# Patient Record
Sex: MALE | Race: BLACK OR AFRICAN AMERICAN | NOT HISPANIC OR LATINO | Employment: FULL TIME | URBAN - METROPOLITAN AREA
[De-identification: names, ages, dates, MRNs, and addresses within clinical notes are randomized per-mention and may not be internally consistent; named-entity substitution may affect disease eponyms.]

---

## 2017-07-27 ENCOUNTER — HOSPITAL ENCOUNTER (EMERGENCY)
Facility: HOSPITAL | Age: 32
Discharge: HOME | End: 2017-07-27
Admitting: EMERGENCY MEDICINE
Payer: COMMERCIAL

## 2017-07-27 VITALS
DIASTOLIC BLOOD PRESSURE: 69 MMHG | WEIGHT: 142.2 LBS | SYSTOLIC BLOOD PRESSURE: 125 MMHG | TEMPERATURE: 98.3 F | HEART RATE: 90 BPM | OXYGEN SATURATION: 98 % | RESPIRATION RATE: 14 BRPM

## 2017-07-27 DIAGNOSIS — L02.31 ABSCESS OF BUTTOCK, LEFT: Primary | ICD-10-CM

## 2017-07-27 PROCEDURE — 99283 EMERGENCY DEPT VISIT LOW MDM: CPT

## 2017-07-27 RX ORDER — HYDROCODONE BITARTRATE AND ACETAMINOPHEN 5; 325 MG/1; MG/1
1 TABLET ORAL EVERY 6 HOURS PRN
Qty: 8 TABLET | Refills: 0 | Status: SHIPPED | OUTPATIENT
Start: 2017-07-27 | End: 2017-08-06

## 2017-07-27 RX ORDER — SULFAMETHOXAZOLE AND TRIMETHOPRIM 800; 160 MG/1; MG/1
1 TABLET ORAL 2 TIMES DAILY
Qty: 20 TABLET | Refills: 0 | Status: SHIPPED | OUTPATIENT
Start: 2017-07-27 | End: 2017-08-06

## 2017-07-27 RX ORDER — CEPHALEXIN 500 MG/1
500 CAPSULE ORAL 4 TIMES DAILY
Qty: 40 CAPSULE | Refills: 0 | Status: SHIPPED | OUTPATIENT
Start: 2017-07-27 | End: 2017-08-06

## 2017-07-27 RX ORDER — LIDOCAINE HYDROCHLORIDE 10 MG/ML
5 INJECTION, SOLUTION EPIDURAL; INFILTRATION; INTRACAUDAL; PERINEURAL ONCE
Status: COMPLETED | OUTPATIENT
Start: 2017-07-27 | End: 2017-07-27

## 2017-07-27 RX ADMIN — LIDOCAINE HYDROCHLORIDE 5 ML: 10 INJECTION, SOLUTION EPIDURAL; INFILTRATION; INTRACAUDAL; PERINEURAL at 12:08

## 2018-07-24 ENCOUNTER — HOSPITAL ENCOUNTER (EMERGENCY)
Facility: HOSPITAL | Age: 33
Discharge: HOME | End: 2018-07-24
Attending: EMERGENCY MEDICINE | Admitting: EMERGENCY MEDICINE
Payer: COMMERCIAL

## 2018-07-24 VITALS
OXYGEN SATURATION: 99 % | SYSTOLIC BLOOD PRESSURE: 112 MMHG | RESPIRATION RATE: 20 BRPM | DIASTOLIC BLOOD PRESSURE: 69 MMHG | HEART RATE: 84 BPM | WEIGHT: 142.2 LBS | TEMPERATURE: 98.4 F

## 2018-07-24 DIAGNOSIS — L03.211 FACIAL CELLULITIS: Primary | ICD-10-CM

## 2018-07-24 PROCEDURE — 99282 EMERGENCY DEPT VISIT SF MDM: CPT

## 2018-07-24 RX ORDER — IBUPROFEN 600 MG/1
600 TABLET ORAL EVERY 6 HOURS PRN
Qty: 30 TABLET | Refills: 0 | Status: SHIPPED | OUTPATIENT
Start: 2018-07-24 | End: 2018-11-04 | Stop reason: ALTCHOICE

## 2018-07-24 RX ORDER — CEPHALEXIN 500 MG/1
500 CAPSULE ORAL EVERY 6 HOURS SCHEDULED
Qty: 28 CAPSULE | Refills: 0 | Status: SHIPPED | OUTPATIENT
Start: 2018-07-24 | End: 2018-07-31

## 2018-07-24 RX ORDER — CEPHALEXIN 250 MG/1
500 CAPSULE ORAL ONCE
Status: COMPLETED | OUTPATIENT
Start: 2018-07-24 | End: 2018-07-24

## 2018-07-24 RX ADMIN — CEPHALEXIN 500 MG: 250 CAPSULE ORAL at 21:51

## 2018-07-25 NOTE — DISCHARGE INSTRUCTIONS
Ibuprofen 600mg by mouth every 6 hours as needed for mild to moderate pain or fever  Acetaminophen 650mg by mouth every 8 hours as needed for mild to moderate pain or fever  You can take Ibuprofen and Acetaminophen together safely  Cellulitis, Ambulatory Care   GENERAL INFORMATION:   Cellulitis  is a skin infection caused by bacteria  Common symptoms include the following:   · Fever    · A red, warm, swollen area on your skin    · Pain when the area is touched    · Bumps or blisters (abscess) that may drain pus    · Bumpy, raised skin that feels like an orange peel  Seek immediate care for the following symptoms:   · An increase in pain, redness, warmth, and size    · Red streaks coming from the infected area    · A thin, gray-brown discharge coming from your infected skin area    · A crackling under your skin when you touch it    · Purple dots or bumps on your skin, or bleeding under your skin    · New swelling and pain in your legs    · Sudden trouble breathing or chest pain  Treatment for cellulitis  may include medicines to treat the bacterial infection or decrease pain  The infection may need to be cleaned out  Damaged, dead, or infected tissue may need to be cut away to help your wound heal   Manage your symptoms:   · Elevate your wound above the level of your heart  as often as you can  This will help decrease swelling and pain  Prop your wound on pillows or blankets to keep it elevated comfortably  · Clean your wound as directed  You may need to wash the wound with soap and water  Look for signs of infection  · Wear pressure stockings as directed  The stockings are tight and put pressure on your legs  This improves blood flow and decreases swelling  Prevent cellulitis:   · Wash your hands often  Use soap and water  Wash your hands after you use the bathroom, change a child's diapers, or sneeze  Wash your hands before you prepare or eat food  Use lotion to prevent dry, cracked skin  · Do not share personal items, such as towels, clothing, and razors  · Clean exercise equipment  with germ-killing  before and after you use it  Follow up with your healthcare provider as directed:  Write down your questions so you remember to ask them during your visits  CARE AGREEMENT:   You have the right to help plan your care  Learn about your health condition and how it may be treated  Discuss treatment options with your caregivers to decide what care you want to receive  You always have the right to refuse treatment  The above information is an  only  It is not intended as medical advice for individual conditions or treatments  Talk to your doctor, nurse or pharmacist before following any medical regimen to see if it is safe and effective for you  © 2014 [address] [employer] is for End User's use only and may not be sold, redistributed or otherwise used for commercial purposes  All illustrations and images included in CareNotes® are the copyrighted property of [employer]  or [employer]

## 2018-07-25 NOTE — ED PROVIDER NOTES
History  Chief Complaint   Patient presents with    Dental Swelling     left upper dental pain with swelling to the left cheek area  denies having dentist  no current antibiotics  79-year-old male with past medical history of asthma who presents to the emergency department for left facial pain and swelling x1 day  States that he initially had increased swelling during the day, which gradually subsided at the day progressed  Patient describes the pain as aching and intermittent 6/10 pain that is localized to the left cheek  Denies redness, warmth  Denies fevers, chills, nausea, vomiting, URI symptoms, headache, neck pain or stiffness, cough, chest pain, shortness of breath  Denies any dental pain  Denies any new lotions, detergents, soaps  History provided by:  Patient   used: No        None       Past Medical History:   Diagnosis Date    Asthma        History reviewed  No pertinent surgical history  History reviewed  No pertinent family history  I have reviewed and agree with the history as documented  Social History   Substance Use Topics    Smoking status: Current Every Day Smoker    Smokeless tobacco: Never Used    Alcohol use Yes      Comment: socially        Review of Systems   Constitutional: Negative for chills and fever  HENT: Positive for facial swelling  Negative for congestion, dental problem, ear pain, postnasal drip, rhinorrhea, sinus pain, sinus pressure, sore throat and trouble swallowing  Eyes: Negative  Respiratory: Negative for cough, chest tightness, shortness of breath and wheezing  Cardiovascular: Negative for chest pain, palpitations and leg swelling  Gastrointestinal: Negative for abdominal pain, anal bleeding, constipation, diarrhea, nausea and vomiting  Genitourinary: Negative for dysuria, flank pain, frequency, hematuria and urgency     Musculoskeletal: Negative for arthralgias, back pain, gait problem, joint swelling, myalgias, neck pain and neck stiffness  Skin: Negative for color change, pallor, rash and wound  Neurological: Negative for dizziness, syncope, weakness, light-headedness, numbness and headaches  Psychiatric/Behavioral: Negative  Physical Exam  Physical Exam   Constitutional: He is oriented to person, place, and time  He appears well-developed and well-nourished  No distress  HENT:   Head: Normocephalic and atraumatic  Bilateral Tms are non-bulging and without erythema  Posterior oropharynx without erythema or edema  No tonsilar hypertrophy  Uvula is midline and non-edematous  Eyes: Conjunctivae and EOM are normal  Pupils are equal, round, and reactive to light  Neck: Normal range of motion  Neck supple  Cardiovascular: Normal rate, regular rhythm and intact distal pulses  Pulmonary/Chest: Effort normal and breath sounds normal  He has no wheezes  He has no rales  Abdominal: Soft  Bowel sounds are normal  He exhibits no distension  There is no tenderness  There is no rebound and no guarding  Musculoskeletal: Normal range of motion  He exhibits no edema or tenderness  Neurological: He is alert and oriented to person, place, and time  No cranial nerve deficit or sensory deficit  He exhibits normal muscle tone  Coordination normal    Skin: Skin is warm and dry  Capillary refill takes less than 2 seconds  He is not diaphoretic  Left maxilla and cheek with edema, but no overlying erythema or warmth  Psychiatric: He has a normal mood and affect  His behavior is normal    Nursing note and vitals reviewed        Vital Signs  ED Triage Vitals [07/24/18 2113]   Temperature Pulse Respirations Blood Pressure SpO2   98 4 °F (36 9 °C) 84 20 112/69 99 %      Temp src Heart Rate Source Patient Position - Orthostatic VS BP Location FiO2 (%)   -- Monitor -- Right arm --      Pain Score       6           Vitals:    07/24/18 2113   BP: 112/69   Pulse: 84       Visual Acuity      ED Medications  Medications   cephalexin (KEFLEX) capsule 500 mg (500 mg Oral Given 7/24/18 4781)       Diagnostic Studies  Results Reviewed     None                 No orders to display              Procedures  Procedures       Phone Contacts  ED Phone Contact    ED Course                               MDM  Number of Diagnoses or Management Options  Facial cellulitis:   Diagnosis management comments: 33-year-old male with past medical history of asthma who presents to the emergency department for left facial pain and swelling x1 day  Differential Diagnosis includes but is not limited to:  Facial cellulitis, parotitis, low suspicion for abscess, allergic reaction  Patient likely has facial cellulitis  Will treat with trial Keflex as outpatient  Low suspicion for dental abscess or infection as patient denies dental pain  Vital signs stable at this time  Discharge home with primary care follow-up  CritCare Time    Disposition  Final diagnoses:   Facial cellulitis     Time reflects when diagnosis was documented in both MDM as applicable and the Disposition within this note     Time User Action Codes Description Comment    7/24/2018  9:47  [facility], [facility] Joelle [P30 [id number]] Facial cellulitis       ED Disposition     ED Disposition Condition Comment    Discharge  [address] discharge to home/self care      Condition at discharge: Good        Follow-up Information     Follow up With Specialties Details Why [facility] In 1 week  [address] [place] [id number] [phone]          Discharge Medication List as of 7/24/2018  9:51 PM      START taking these medications    Details   cephalexin (KEFLEX) 500 mg capsule Take 1 capsule (500 mg total) by mouth every 6 (six) hours for 7 days, Starting Tue 7/24/2018, Until Tue 7/31/2018, Print      ibuprofen (MOTRIN) 600 mg tablet Take 1 tablet (600 mg total) by mouth every 6 (six) hours as needed for mild pain or moderate pain, Starting Tue 7/24/2018, Print           No discharge procedures on file      ED Provider  Electronically Signed by           Sravan Box PA-C  07/24/18 0844

## 2018-11-04 ENCOUNTER — HOSPITAL ENCOUNTER (EMERGENCY)
Facility: HOSPITAL | Age: 33
Discharge: HOME | End: 2018-11-04
Attending: EMERGENCY MEDICINE | Admitting: EMERGENCY MEDICINE
Payer: COMMERCIAL

## 2018-11-04 VITALS
DIASTOLIC BLOOD PRESSURE: 58 MMHG | TEMPERATURE: 99.4 F | HEART RATE: 95 BPM | WEIGHT: 148 LBS | SYSTOLIC BLOOD PRESSURE: 122 MMHG | OXYGEN SATURATION: 98 % | RESPIRATION RATE: 18 BRPM

## 2018-11-04 DIAGNOSIS — K52.9 GASTROENTERITIS: Primary | ICD-10-CM

## 2018-11-04 PROCEDURE — C9113 INJ PANTOPRAZOLE SODIUM, VIA: HCPCS | Performed by: PHYSICIAN ASSISTANT

## 2018-11-04 PROCEDURE — 99283 EMERGENCY DEPT VISIT LOW MDM: CPT

## 2018-11-04 PROCEDURE — 96374 THER/PROPH/DIAG INJ IV PUSH: CPT

## 2018-11-04 PROCEDURE — 96361 HYDRATE IV INFUSION ADD-ON: CPT

## 2018-11-04 PROCEDURE — 96375 TX/PRO/DX INJ NEW DRUG ADDON: CPT

## 2018-11-04 RX ORDER — ONDANSETRON 4 MG/1
4 TABLET, FILM COATED ORAL EVERY 8 HOURS PRN
Qty: 20 TABLET | Refills: 0 | Status: SHIPPED | OUTPATIENT
Start: 2018-11-04 | End: 2019-01-28

## 2018-11-04 RX ORDER — DICYCLOMINE HYDROCHLORIDE 10 MG/1
10 CAPSULE ORAL
Qty: 20 CAPSULE | Refills: 0 | Status: SHIPPED | OUTPATIENT
Start: 2018-11-04 | End: 2019-01-28

## 2018-11-04 RX ORDER — PANTOPRAZOLE SODIUM 40 MG/1
40 INJECTION, POWDER, FOR SOLUTION INTRAVENOUS ONCE
Status: COMPLETED | OUTPATIENT
Start: 2018-11-04 | End: 2018-11-04

## 2018-11-04 RX ORDER — DICYCLOMINE HCL 20 MG
20 TABLET ORAL ONCE
Status: COMPLETED | OUTPATIENT
Start: 2018-11-04 | End: 2018-11-04

## 2018-11-04 RX ORDER — ONDANSETRON 2 MG/ML
4 INJECTION INTRAMUSCULAR; INTRAVENOUS ONCE
Status: COMPLETED | OUTPATIENT
Start: 2018-11-04 | End: 2018-11-04

## 2018-11-04 RX ADMIN — PANTOPRAZOLE SODIUM 40 MG: 40 INJECTION, POWDER, FOR SOLUTION INTRAVENOUS at 22:49

## 2018-11-04 RX ADMIN — DICYCLOMINE HYDROCHLORIDE 20 MG: 20 TABLET ORAL at 22:49

## 2018-11-04 RX ADMIN — SODIUM CHLORIDE 1000 ML: 0.9 INJECTION, SOLUTION INTRAVENOUS at 22:49

## 2018-11-04 RX ADMIN — ONDANSETRON 4 MG: 2 INJECTION, SOLUTION INTRAMUSCULAR; INTRAVENOUS at 22:49

## 2018-11-05 NOTE — DISCHARGE INSTRUCTIONS
Enteritis   AMBULATORY CARE:   Enteritis  is inflammation of the small intestine  It may be caused by eating foods or drinking liquids contaminated with a virus, bacteria, or parasites  It may also be caused by certain medicines, damage from radiation, and medical conditions such as Crohn disease  Common signs and symptoms include the following:   · Diarrhea    · Blood or mucus in your bowel movements    · Nausea and vomiting    · Fever    · Abdominal pain  Seek care immediately if:   · You cannot stop vomiting  · You have not urinated for 12 hours  Contact your healthcare provider if:   · You have a fever over 101 5  · You have blood or mucus in your bowel movements  · You continue to vomit or have diarrhea for more than 3 days, even after treatment  · You have a dry mouth and eyes, you are urinating less than usual, and you feel dizzy when you stand up  · Your mouth or eyes are dry  You are not urinating as much or as often  · You are losing weight without trying  · You have questions or concerns about your condition or care  Treatment for enteritis  depends on the cause  Enteritis may get better on its own, or you may need any of the following to treat and manage enteritis:  · Medicines  may be given to fight an infection caused by bacteria or a parasite  You may also need medicines to slow or stop your diarrhea or vomiting  Do not take these medicines unless your healthcare provider say it is okay  Other medicines may be needed to treat medical conditions that are causing enteritis  · Eat foods that help to decrease symptoms  Limit or avoid foods and liquids that are high in sugar, fat, and fiber to help relieve diarrhea  It may be helpful to avoid lactose  Lactose is a type of sugar that is found in milk products  You may be able to tolerate soups, broths, well-cooked vegetables, canned fruit, and baked or broiled meats   Ask your dietitian or healthcare provider if you should follow a special diet  You may need to avoid other foods if you have certain medical conditions such as celiac disease  · Drink liquids as directed  Ask how much liquid to drink each day and which liquids are best for you  It is important to prevent or treat dehydration  Even if you have been vomiting, suck on ice chips or take small sips of clear liquids often  Slowly increase the amount of clear liquids you drink  If you become dehydrated, you may need IV liquids  · Drink an oral rehydration solution (ORS) as directed  An ORS contains water, salts, and sugar that are needed to replace lost body fluids  Ask what kind of ORS to use, how much to drink, and where to get it  Prevent enteritis:  Enteritis that is caused by bacteria, parasites, or viruses can be prevented  The following may help to prevent this type of enteritis:  · Wash your hands often  Use soap and water  Wash your hands after you use the bathroom, change a child's diapers, or sneeze  Wash your hands before you prepare or eat food  · Clean surfaces and do laundry often  Wash your clothes and towels separately from the rest of the laundry  Clean surfaces in your home with antibacterial  or bleach  · Clean food thoroughly and cook safely  Wash raw vegetables before you cook  Cook meat, fish, and eggs fully  Do not use the same dishes for raw meat as you do for other foods  Refrigerate any leftover food immediately  · Be aware when you camp or travel  Drink only clean water  Do not drink from rivers or lakes unless you purify or boil the water first  When you travel, drink bottled water and do not add ice  Do not eat fruit that has not been peeled  Do not eat raw fish or meat that is not fully cooked  © 2017 [address]  Information is for End User's use only and may not be sold, redistributed or otherwise used for commercial purposes   All illustrations and images included in CareNotes® are the copyrighted property of [employer]  or Anmol Mitchell  The above information is an  only  It is not intended as medical advice for individual conditions or treatments  Talk to your doctor, nurse or pharmacist before following any medical regimen to see if it is safe and effective for you

## 2018-11-05 NOTE — ED PROVIDER NOTES
History  Chief Complaint   Patient presents with    Vomiting     Vomiting since this morning, intermittent cold sweats, headache  Patient is a 68-year-old healthy male who presents for evaluation of nausea vomiting and diarrhea  He ate a piece of meat last night which is girlfriend questions might be the cause of his symptoms  He denies any other past medical history  He denies any abdominal pain other on deep palpation  This pain is generalized  Denies any urinary complaints  Denies back pain fevers  Denies chest pain, or shortness of breath  Vomiting is without blood and diarrhea was without blood  None       Past Medical History:   Diagnosis Date    Asthma        History reviewed  No pertinent surgical history  History reviewed  No pertinent family history  I have reviewed and agree with the history as documented  Social History   Substance Use Topics    Smoking status: Current Every Day Smoker     Packs/day: 0 25    Smokeless tobacco: Never Used    Alcohol use Yes      Comment: socially        Review of Systems   Constitutional: Negative for activity change, appetite change and fatigue  HENT: Negative for nosebleeds, sneezing, sore throat, trouble swallowing and voice change  Eyes: Negative for photophobia, pain and visual disturbance  Respiratory: Negative for apnea, choking and stridor  Cardiovascular: Negative for palpitations and leg swelling  Gastrointestinal: Positive for diarrhea, nausea and vomiting  Negative for anal bleeding and constipation  Endocrine: Negative for cold intolerance, heat intolerance, polydipsia and polyphagia  Genitourinary: Negative for decreased urine volume, enuresis, frequency, genital sores and urgency  Musculoskeletal: Negative for joint swelling and myalgias  Allergic/Immunologic: Negative for environmental allergies and food allergies  Neurological: Negative for tremors, seizures, speech difficulty and weakness  Hematological: Negative for adenopathy  Psychiatric/Behavioral: Negative for behavioral problems, decreased concentration, dysphoric mood and hallucinations  Physical Exam  Physical Exam   Constitutional: He is oriented to person, place, and time  He appears well-developed and well-nourished  No distress  HENT:   Head: Normocephalic and atraumatic  Right Ear: External ear normal    Left Ear: External ear normal    Nose: Nose normal    Mouth/Throat: Oropharynx is clear and moist    Eyes: Pupils are equal, round, and reactive to light  Conjunctivae and EOM are normal    Neck: Normal range of motion  Neck supple  Cardiovascular: Normal rate, regular rhythm and normal heart sounds  Exam reveals no gallop and no friction rub  No murmur heard  Pulmonary/Chest: Effort normal and breath sounds normal  No respiratory distress  He has no wheezes  Abdominal: Soft  Bowel sounds are normal  He exhibits no distension  There is tenderness  There is mild tenderness with deep palpation that is generalized and nonfocal    Neurological: He is alert and oriented to person, place, and time  Skin: Skin is warm and dry  He is not diaphoretic  Psychiatric: He has a normal mood and affect  His behavior is normal    Vitals reviewed        Vital Signs  ED Triage Vitals [11/04/18 2040]   Temperature Pulse Respirations Blood Pressure SpO2   99 4 °F (37 4 °C) 95 18 122/58 98 %      Temp Source Heart Rate Source Patient Position - Orthostatic VS BP Location FiO2 (%)   Oral Monitor Sitting Right arm --      Pain Score       8           Vitals:    11/04/18 2040   BP: 122/58   Pulse: 95   Patient Position - Orthostatic VS: Sitting       Visual Acuity      ED Medications  Medications   sodium chloride 0 9 % bolus 1,000 mL (not administered)   ondansetron (ZOFRAN) injection 4 mg (not administered)   pantoprazole (PROTONIX) injection 40 mg (not administered)   dicyclomine (BENTYL) tablet 20 mg (not administered) Diagnostic Studies  Results Reviewed     None                 No orders to display              Procedures  Procedures       Phone Contacts  ED Phone Contact    ED Course                               MDM  Number of Diagnoses or Management Options  Gastroenteritis:   Diagnosis management comments: Patient notes relief of his symptoms after fluids and medications and is ready for discharge  CritCare Time    Disposition  Final diagnoses:   None     ED Disposition     None      Follow-up Information    None         Patient's Medications   Discharge Prescriptions    No medications on file     No discharge procedures on file      ED Provider  Electronically Signed by           Naif Jasso PA-C  11/04/18 8622

## 2019-01-28 ENCOUNTER — HOSPITAL ENCOUNTER (EMERGENCY)
Facility: HOSPITAL | Age: 34
Discharge: HOME | End: 2019-01-28
Attending: EMERGENCY MEDICINE
Payer: COMMERCIAL

## 2019-01-28 VITALS
DIASTOLIC BLOOD PRESSURE: 76 MMHG | TEMPERATURE: 98.3 F | HEART RATE: 80 BPM | WEIGHT: 147.93 LBS | RESPIRATION RATE: 16 BRPM | SYSTOLIC BLOOD PRESSURE: 124 MMHG | OXYGEN SATURATION: 100 %

## 2019-01-28 DIAGNOSIS — S05.8X1A BLUNT TRAUMA OF RIGHT EYE, INITIAL ENCOUNTER: Primary | ICD-10-CM

## 2019-01-28 DIAGNOSIS — H57.11 EYE PAIN, RIGHT: ICD-10-CM

## 2019-01-28 PROCEDURE — 99283 EMERGENCY DEPT VISIT LOW MDM: CPT

## 2019-01-28 RX ORDER — TETRACAINE HYDROCHLORIDE 5 MG/ML
2 SOLUTION OPHTHALMIC ONCE
Status: COMPLETED | OUTPATIENT
Start: 2019-01-28 | End: 2019-01-28

## 2019-01-28 RX ADMIN — FLUORESCEIN SODIUM 1 STRIP: 1 STRIP OPHTHALMIC at 09:20

## 2019-01-28 RX ADMIN — TETRACAINE HYDROCHLORIDE 2 DROP: 5 SOLUTION OPHTHALMIC at 09:19
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
## 2019-01-28 NOTE — DISCHARGE INSTRUCTIONS
Eye Pain   WHAT YOU NEED TO KNOW:   Eye pain may be caused by a problem within your eye  A problem or condition in another body area can also cause pain that travels to your eye  Some causes of eye pain include dry eyes, inflammation, a sinus infection, or a cluster headache  DISCHARGE INSTRUCTIONS:   Medicines: You may need any of the following:  · Artificial tears  are eyedrops that can help moisturize your eyes and relieve your pain  Ask your healthcare provider how often to use artificial tears  · NSAIDs , such as ibuprofen, help decrease swelling, pain, and fever  This medicine is available with or without a doctor's order  NSAIDs can cause stomach bleeding or kidney problems in certain people  If you take blood thinner medicine, always ask if NSAIDs are safe for you  Always read the medicine label and follow directions  Do not give these medicines to children under 10months of age without direction from your child's healthcare provider  · Take your medicine as directed  Contact your healthcare provider if you think your medicine is not helping or if you have side effects  Tell him of her if you are allergic to any medicine  Keep a list of the medicines, vitamins, and herbs you take  Include the amounts, and when and why you take them  Bring the list or the pill bottles to follow-up visits  Carry your medicine list with you in case of an emergency  Follow up with your healthcare provider as directed: You may be referred to an eye specialist  Write down your questions so you remember to ask them during your visits  Contact your healthcare provider if:   · You have a fever  · Your eye pain gets worse when you move your eyes  · You have questions or concerns about your condition or care  Return to the emergency department if:   · You have any vision loss  · You have sudden vision changes such as blurred vision, double vision, or seeing halos around lights       · You develop severe eye pain   © 2017 [facility] Information is for End User's use only and may not be sold, redistributed or otherwise used for commercial purposes  All illustrations and images included in CareNotes® are the copyrighted property of A D A M [employer] Inc  or Anmol Mitchell  The above information is an  only  It is not intended as medical advice for individual conditions or treatments  Talk to your doctor, nurse or pharmacist before following any medical regimen to see if it is safe and effective for you  DISCHARGE INSTRUCTIONS:    FOLLOW UP WITH YOUR PRIMARY CARE PROVIDER OR THE [address] [place] [address]  MAKE AN APPOINTMENT TO BE SEEN  GO DIRECTLY TO [address] [place] [address]  IF SYMPTOMS WORSEN OR NEW SYMPTOMS ARISE, RETURN TO THE ER TO BE SEEN

## 2021-01-09 ENCOUNTER — HOSPITAL ENCOUNTER (EMERGENCY)
Facility: HOSPITAL | Age: 36
Discharge: HOME | End: 2021-01-09
Attending: EMERGENCY MEDICINE | Admitting: EMERGENCY MEDICINE
Payer: COMMERCIAL

## 2021-01-09 ENCOUNTER — APPOINTMENT (EMERGENCY)
Dept: RADIOLOGY | Facility: HOSPITAL | Age: 36
End: 2021-01-09
Payer: COMMERCIAL

## 2021-01-09 VITALS
OXYGEN SATURATION: 99 % | SYSTOLIC BLOOD PRESSURE: 175 MMHG | WEIGHT: 152.34 LBS | HEART RATE: 82 BPM | RESPIRATION RATE: 17 BRPM | DIASTOLIC BLOOD PRESSURE: 74 MMHG | TEMPERATURE: 98.7 F

## 2021-01-09 DIAGNOSIS — S62.336A CLOSED DISPLACED FRACTURE OF NECK OF FIFTH METACARPAL BONE OF RIGHT HAND, INITIAL ENCOUNTER: Primary | ICD-10-CM

## 2021-01-09 PROCEDURE — 73130 X-RAY EXAM OF HAND: CPT

## 2021-01-09 PROCEDURE — 99284 EMERGENCY DEPT VISIT MOD MDM: CPT | Performed by: EMERGENCY MEDICINE

## 2021-01-09 PROCEDURE — 99283 EMERGENCY DEPT VISIT LOW MDM: CPT

## 2021-01-09 PROCEDURE — 90471 IMMUNIZATION ADMIN: CPT

## 2021-01-09 PROCEDURE — 29125 APPL SHORT ARM SPLINT STATIC: CPT | Performed by: EMERGENCY MEDICINE

## 2021-01-09 PROCEDURE — 90715 TDAP VACCINE 7 YRS/> IM: CPT | Performed by: EMERGENCY MEDICINE

## 2021-01-09 RX ORDER — NAPROXEN 500 MG/1
500 TABLET ORAL 2 TIMES DAILY WITH MEALS
Qty: 20 TABLET | Refills: 0 | Status: SHIPPED | OUTPATIENT
Start: 2021-01-09 | End: 2021-07-27

## 2021-01-09 RX ORDER — IBUPROFEN 600 MG/1
600 TABLET ORAL ONCE
Status: COMPLETED | OUTPATIENT
Start: 2021-01-09 | End: 2021-01-09

## 2021-01-09 RX ADMIN — IBUPROFEN 600 MG: 600 TABLET ORAL at 13:09

## 2021-01-09 RX ADMIN — TETANUS TOXOID, REDUCED DIPHTHERIA TOXOID AND ACELLULAR PERTUSSIS VACCINE, ADSORBED 0.5 ML: 5; 2.5; 8; 8; 2.5 SUSPENSION INTRAMUSCULAR at 13:26

## 2021-01-09 NOTE — DISCHARGE INSTRUCTIONS
Take the Naprosyn twice daily for the next 5-10 days  The number for the hand surgeon is included in these discharge instructions, call to be seen in the office for further evaluation and management  Keep the splint on until you are evaluated by the surgeon
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
## 2021-01-09 NOTE — Clinical Note
Lianet Peck was seen and treated in our emergency department on 1/9/2021  No work until cleared by Family Doctor/Orthopedics        Diagnosis:     Dysgrace    He may return on this date: If you have any questions or concerns, please don't hesitate to call        Lidia Gaitan MD    ______________________________           _______________          _______________  Hospital Representative                              Date                                Time
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
## 2021-01-11 ENCOUNTER — TELEPHONE (OUTPATIENT)
Dept: OBGYN CLINIC | Facility: HOSPITAL | Age: 36
End: 2021-01-11

## 2021-07-27 ENCOUNTER — HOSPITAL ENCOUNTER (EMERGENCY)
Facility: HOSPITAL | Age: 36
Discharge: HOME | End: 2021-07-27
Attending: EMERGENCY MEDICINE | Admitting: EMERGENCY MEDICINE
Payer: COMMERCIAL

## 2021-07-27 VITALS
RESPIRATION RATE: 18 BRPM | DIASTOLIC BLOOD PRESSURE: 88 MMHG | OXYGEN SATURATION: 100 % | SYSTOLIC BLOOD PRESSURE: 135 MMHG | HEART RATE: 72 BPM | TEMPERATURE: 98.5 F

## 2021-07-27 DIAGNOSIS — K02.9 PAIN DUE TO DENTAL CARIES: Primary | ICD-10-CM

## 2021-07-27 DIAGNOSIS — K02.9 DENTAL CARIES: ICD-10-CM

## 2021-07-27 PROCEDURE — 99284 EMERGENCY DEPT VISIT MOD MDM: CPT | Performed by: EMERGENCY MEDICINE

## 2021-07-27 PROCEDURE — 96372 THER/PROPH/DIAG INJ SC/IM: CPT

## 2021-07-27 PROCEDURE — 99282 EMERGENCY DEPT VISIT SF MDM: CPT

## 2021-07-27 RX ORDER — PENICILLIN V POTASSIUM 500 MG/1
500 TABLET ORAL 4 TIMES DAILY
Qty: 40 TABLET | Refills: 0 | Status: SHIPPED | OUTPATIENT
Start: 2021-07-27 | End: 2021-08-06

## 2021-07-27 RX ORDER — KETOROLAC TROMETHAMINE 10 MG/1
10 TABLET, FILM COATED ORAL EVERY 6 HOURS PRN
Qty: 20 TABLET | Refills: 0 | Status: SHIPPED | OUTPATIENT
Start: 2021-07-27

## 2021-07-27 RX ORDER — PENICILLIN V POTASSIUM 250 MG/1
500 TABLET ORAL ONCE
Status: COMPLETED | OUTPATIENT
Start: 2021-07-27 | End: 2021-07-27

## 2021-07-27 RX ORDER — KETOROLAC TROMETHAMINE 30 MG/ML
15 INJECTION, SOLUTION INTRAMUSCULAR; INTRAVENOUS ONCE
Status: COMPLETED | OUTPATIENT
Start: 2021-07-27 | End: 2021-07-27

## 2021-07-27 RX ADMIN — KETOROLAC TROMETHAMINE 15 MG: 30 INJECTION, SOLUTION INTRAMUSCULAR; INTRAVENOUS at 07:01

## 2021-07-27 RX ADMIN — PENICILLIN V POTASSIUM 500 MG: 250 TABLET, FILM COATED ORAL at 07:01

## 2022-04-29 NOTE — ED PROVIDER NOTES
History  Chief Complaint   Patient presents with    Dental Pain     c/o bottom right wisdom tooth pain  States "I can't sleep and I don't have a dentist "      31 y o  M p/w right lower molar pain x yesterday  History provided by:  Patient   used: No    Dental Pain  Location:  Lower  Lower teeth location:  32/RL 3rd molar  Duration:  1 day  Chronicity:  New  Relieved by:  Nothing  Worsened by:  Nothing  Ineffective treatments:  Acetaminophen and topical anesthetic gel  Associated symptoms: no difficulty swallowing, no drooling, no facial swelling, no fever, no gum swelling, no neck pain and no trismus    Risk factors: lack of dental care        None       Past Medical History:   Diagnosis Date    Asthma        History reviewed  No pertinent surgical history  History reviewed  No pertinent family history  I have reviewed and agree with the history as documented  E-Cigarette/Vaping     E-Cigarette/Vaping Substances     Social History     Tobacco Use    Smoking status: Current Every Day Smoker     Packs/day: 0 25    Smokeless tobacco: Never Used   Substance Use Topics    Alcohol use: Yes     Comment: socially    Drug use: Not Currently     Types: Marijuana       Review of Systems   Constitutional: Negative for fever  HENT: Positive for dental problem  Negative for drooling, facial swelling and trouble swallowing  Gastrointestinal: Negative for nausea and vomiting  Musculoskeletal: Negative for neck pain and neck stiffness  All other systems reviewed and are negative  Physical Exam  Physical Exam  Vitals and nursing note reviewed  Constitutional:       General: He is not in acute distress  Appearance: He is well-developed  He is not ill-appearing, toxic-appearing or diaphoretic  HENT:      Head: Normocephalic  Jaw: There is normal jaw occlusion  No trismus, swelling, pain on movement or malocclusion        Right Ear: External ear normal       Left Ear: Pt seen sitting up in chair, awake and alert.  Education provided to pt regarding CHF disease process, symptoms, and self care activities.  Pt stated that he does have a scale at home and is able to monitor and track daily weights.  Stated he currently does not weigh himself, but verbalized that he will start to track daily weights.  Reviewed 2000 mg sodium dietary restriction, 64 oz fluid restriction, medication regimen, and need for close follow-up after discharge.  CHF \"stop light\" management tool reviewed with pt along with the contents of the CHF discharge folder, which include: the Living Well with Heart Failure booklet, guide for sodium content of foods, and daily weight grid.  Information and instruction to access the [facility] My HF guide/Heart Failure interactive workbook via the link and QR code was provided.  CABG scheduled tentatively for 5/4/22.  Pt stated that he lives about an hour away from here, not sure where he will follow-up after discharge.  Stated that the [facility] have a cardiac rehab program.  Will continue to follow to assist with follow-up appt with cardiology scheduled at discharge.  CHF education placed in AVS.  Verbalized understanding of above.  Time spent educating 60 minutes.  Last Flu vaccine 10/12/21  Last Pneumo vaccine 10/17/17   External ear normal       Nose: Nose normal       Mouth/Throat:      Mouth: Mucous membranes are moist  Mucous membranes are not pale and not dry  No angioedema  Dentition: Abnormal dentition  Dental tenderness and dental caries present  No dental abscesses  Tongue: Tongue does not deviate from midline  Pharynx: Oropharynx is clear  Uvula midline  No pharyngeal swelling, oropharyngeal exudate, posterior oropharyngeal erythema or uvula swelling  Tonsils: No tonsillar exudate or tonsillar abscesses  Eyes:      General: No scleral icterus  Conjunctiva/sclera:      Right eye: Right conjunctiva is not injected  Left eye: Left conjunctiva is not injected  Neck:      Trachea: Phonation normal       Comments: No erythema overlying neck  No masses or swelling  Cardiovascular:      Rate and Rhythm: Normal rate and regular rhythm  Heart sounds: Normal heart sounds  Pulmonary:      Effort: Pulmonary effort is normal  No accessory muscle usage, respiratory distress or retractions  Breath sounds: Normal breath sounds and air entry  No stridor  No wheezing, rhonchi or rales  Musculoskeletal:      Cervical back: Normal range of motion and neck supple  No edema, erythema, rigidity or crepitus  Lymphadenopathy:      Head:      Right side of head: No submental or submandibular adenopathy  Left side of head: No submental or submandibular adenopathy  Cervical: No cervical adenopathy  Skin:     General: Skin is warm and dry  Coloration: Skin is not cyanotic or mottled  Findings: No erythema or rash  Neurological:      Mental Status: He is alert  He is not disoriented  GCS: GCS eye subscore is 4  GCS verbal subscore is 5  GCS motor subscore is 6        Comments: Cranial nerves grossly intact   Psychiatric:         Behavior: Behavior normal          Vital Signs  ED Triage Vitals   Temperature Pulse Respirations Blood Pressure SpO2   07/27/21 0648 07/27/21 4056 07/27/21 0644 07/27/21 0644 07/27/21 0644   98 5 °F (36 9 °C) 72 18 135/88 100 %      Temp Source Heart Rate Source Patient Position - Orthostatic VS BP Location FiO2 (%)   07/27/21 0648 07/27/21 0644 07/27/21 0644 07/27/21 0644 --   Oral Monitor Sitting Right arm       Pain Score       07/27/21 0646       Worst Possible Pain           Vitals:    07/27/21 0644   BP: 135/88   Pulse: 72   Patient Position - Orthostatic VS: Sitting         Visual Acuity      ED Medications  Medications   penicillin V potassium (VEETID) tablet 500 mg (500 mg Oral Given 7/27/21 0701)   ketorolac (TORADOL) injection 15 mg (15 mg Intramuscular Given 7/27/21 0701)       Diagnostic Studies  Results Reviewed     None                 No orders to display              Procedures  Procedures         ED Course                             SBIRT 22yo+      Most Recent Value   SBIRT (25 yo +)   In order to provide better care to our patients, we are screening all of our patients for alcohol and drug use  Would it be okay to ask you these screening questions? Unable to answer at this time Filed at: 07/27/2021 7096                    MDM    Disposition  Final diagnoses:   Pain due to dental caries   Dental caries     Time reflects when diagnosis was documented in both MDM as applicable and the Disposition within this note     Time User Action Codes Description Comment    7/27/2021  6:53 AM Leslie Mccormick Add [K02 9] Pain due to dental caries     7/27/2021  6:53 AM Roman, Merlinda Combe L Add [K02 9] Dental caries       ED Disposition     ED Disposition Condition Date/Time Comment    Discharge Stable Tue Jul 27, 2021  6:55 AM [address] discharge to home/self care              Follow-up Information     Follow up With Specialties Details Why [facility] [place]  Go to  Accept walk ins from 9am-4pm Monday- Friday, go there today [address] [facility] [address]  [phone]          Discharge Medication List as of 7/27/2021  6:56 AM      START taking these medications    Details   ketorolac (TORADOL) 10 mg tablet Take 1 tablet (10 mg total) by mouth every 6 (six) hours as needed for mild pain, Starting Tue 7/27/2021, Print      penicillin V potassium (VEETID) 500 mg tablet Take 1 tablet (500 mg total) by mouth 4 (four) times a day for 10 days, Starting Tue 7/27/2021, Until Fri 8/6/2021, Print           No discharge procedures on file      PDMP Review     None          ED Provider  Electronically Signed by           Juan Bradshaw 24, DO  07/27/21 4670

## 2023-05-04 ENCOUNTER — HOSPITAL ENCOUNTER (EMERGENCY)
Facility: HOSPITAL | Age: 38
Discharge: HOME | End: 2023-05-04
Attending: EMERGENCY MEDICINE

## 2023-05-04 VITALS
OXYGEN SATURATION: 98 % | RESPIRATION RATE: 16 BRPM | DIASTOLIC BLOOD PRESSURE: 58 MMHG | SYSTOLIC BLOOD PRESSURE: 133 MMHG | HEART RATE: 104 BPM | TEMPERATURE: 97.8 F

## 2023-05-04 DIAGNOSIS — K04.7 DENTAL ABSCESS: Primary | ICD-10-CM

## 2023-05-04 RX ORDER — PENICILLIN V POTASSIUM 500 MG/1
500 TABLET ORAL 4 TIMES DAILY
Qty: 40 TABLET | Refills: 0 | Status: SHIPPED | OUTPATIENT
Start: 2023-05-04 | End: 2023-05-14

## 2023-05-04 RX ORDER — PENICILLIN V POTASSIUM 250 MG/1
500 TABLET ORAL ONCE
Status: COMPLETED | OUTPATIENT
Start: 2023-05-04 | End: 2023-05-04

## 2023-05-04 RX ORDER — IBUPROFEN 600 MG/1
600 TABLET ORAL ONCE
Status: COMPLETED | OUTPATIENT
Start: 2023-05-04 | End: 2023-05-04

## 2023-05-04 RX ORDER — IBUPROFEN 600 MG/1
600 TABLET ORAL EVERY 6 HOURS PRN
Qty: 30 TABLET | Refills: 0 | Status: SHIPPED | OUTPATIENT
Start: 2023-05-04

## 2023-05-04 RX ADMIN — PENICILLIN V POTASSIUM 500 MG: 250 TABLET, FILM COATED ORAL at 12:23

## 2023-05-04 RX ADMIN — IBUPROFEN 600 MG: 600 TABLET, FILM COATED ORAL at 12:23

## 2023-05-04 NOTE — DISCHARGE INSTRUCTIONS
You can alternate acetaminophen 1000mg and ibuprofen 600mg every 3 hours for pain  Try to time your ibuprofen so you can take it when you eat  Follow up with the dental clinic for further evaluation    Return for any trouble breathing, persistent vomiting, fever more than 101, worsening symptoms or for any concerns

## 2023-05-04 NOTE — ED PROVIDER NOTES
History  Chief Complaint   Patient presents with    Dental Pain     Pt reports left sided facial swelling and dental pain x 1 day  30y M w/ 2D of left sided dental pain/facial swelling  Had spontaneous drainage yesterday - foul taste in mouth and was spitting out the drainage, so swelling improved from yesterday, but still swollen and painful  Denies f/c/s, no sig ha  No neck pain or stiffness  No trouble swallowing, feels like he has painful lymph node on the left  Hx of dental infection in the past, doesn't currently have a dentist        History provided by:  Patient   used: No    Dental Pain      Prior to Admission Medications   Prescriptions Last Dose Informant Patient Reported? Taking?   ketorolac (TORADOL) 10 mg tablet Not Taking  No No   Sig: Take 1 tablet (10 mg total) by mouth every 6 (six) hours as needed for mild pain   Patient not taking: Reported on 5/4/2023      Facility-Administered Medications: None       Past Medical History:   Diagnosis Date    Asthma        History reviewed  No pertinent surgical history  History reviewed  No pertinent family history  I have reviewed and agree with the history as documented  E-Cigarette/Vaping     E-Cigarette/Vaping Substances     Social History     Tobacco Use    Smoking status: Every Day     Packs/day: 0 25     Types: Cigarettes    Smokeless tobacco: Never   Substance Use Topics    Alcohol use: Yes     Comment: socially    Drug use: Not Currently     Types: Marijuana       Review of Systems   All other systems reviewed and are negative  Physical Exam  Physical Exam  Vitals and nursing note reviewed  Constitutional:       General: He is not in acute distress  Appearance: Normal appearance  He is normal weight  He is not ill-appearing, toxic-appearing or diaphoretic  HENT:      Head:        Nose: Nose normal       Mouth/Throat:      Lips: Pink        Mouth: Mucous membranes are moist       Dentition: Dental caries and dental abscesses present  Tongue: No lesions  Tongue does not deviate from midline  Pharynx: Oropharynx is clear  Uvula midline  Cardiovascular:      Rate and Rhythm: Regular rhythm  Tachycardia present  Heart sounds: No murmur heard  Pulmonary:      Effort: Pulmonary effort is normal    Lymphadenopathy:      Cervical: Cervical adenopathy (shotty, on the left) present  Skin:     General: Skin is warm  Neurological:      General: No focal deficit present  Mental Status: He is alert  Psychiatric:         Mood and Affect: Mood normal          Vital Signs  ED Triage Vitals   Temperature Pulse Respirations Blood Pressure SpO2   05/04/23 1130 05/04/23 1130 05/04/23 1130 05/04/23 1130 05/04/23 1130   97 8 °F (36 6 °C) 104 16 133/58 98 %      Temp Source Heart Rate Source Patient Position - Orthostatic VS BP Location FiO2 (%)   05/04/23 1130 05/04/23 1130 05/04/23 1130 05/04/23 1130 --   Oral Monitor Sitting Right arm       Pain Score       05/04/23 1223       7           Vitals:    05/04/23 1130   BP: 133/58   Pulse: 104   Patient Position - Orthostatic VS: Sitting         Visual Acuity      ED Medications  Medications   penicillin V potassium (VEETID) tablet 500 mg (500 mg Oral Given 5/4/23 1223)   ibuprofen (MOTRIN) tablet 600 mg (600 mg Oral Given 5/4/23 1223)       Diagnostic Studies  Results Reviewed     None                 No orders to display              Procedures  Procedures         ED Course                               SBIRT 20yo+    Flowsheet Row Most Recent Value   Initial Alcohol Screen: US AUDIT-C     1  How often do you have a drink containing alcohol? 0 Filed at: 05/04/2023 1220   2  How many drinks containing alcohol do you have on a typical day you are drinking? 0 Filed at: 05/04/2023 1220   3a  Male UNDER 65: How often do you have five or more drinks on one occasion?  0 Filed at: 05/04/2023 1220   Audit-C Score 0 Filed at: 05/04/2023 1220   LEAH: How many times in the past year have you    Used an illegal drug or used a prescription medication for non-medical reasons? Never Filed at: 05/04/2023 1220                    Medical Decision Making  Dental abscess w/ some drainage but continued pain/swelling  No features in hx or exam concerning for invasive infection, osteo  Will treat w/ abx and dental clinic f/u    Dental abscess: acute illness or injury  Amount and/or Complexity of Data Reviewed  External Data Reviewed: notes  Details: PDMP reviewed      Risk  Prescription drug management  Disposition  Final diagnoses:   Dental abscess     Time reflects when diagnosis was documented in both MDM as applicable and the Disposition within this note     Time User Action Codes Description Comment    5/4/2023 12:20 PM Gerard Choi Add [K04 7] Dental abscess       ED Disposition     ED Disposition   Discharge    Condition   Stable    Date/Time   Thu May 4, 2023 12:20 PM    Comment   [address] discharge to home/self care                 Follow-up Information     Follow up With Specialties Details Why Contact Info Additional [address] [facility] Schedule an appointment as soon as possible for a visit in 1 week for further evaluation and treatment [address] [phone]  [facility] [address], [address] [place] [address], [place], [place], [address], [phone]          Discharge Medication List as of 5/4/2023 12:22 PM      START taking these medications    Details   ibuprofen (MOTRIN) 600 mg tablet Take 1 tablet (600 mg total) by mouth every 6 (six) hours as needed for moderate pain, Starting Thu 5/4/2023, Normal      penicillin V potassium (VEETID) 500 mg tablet Take 1 tablet (500 mg total) by mouth 4 (four) times a day for 10 days, Starting Thu 5/4/2023, Until Sun 5/14/2023, Normal         CONTINUE these medications which have NOT CHANGED    Details   ketorolac (TORADOL) 10 mg tablet Take 1 tablet (10 mg total) by mouth every 6 (six) hours as needed for mild pain, Starting Tue 7/27/2021, Print             No discharge procedures on file      PDMP Review       Value Time User    PDMP Reviewed  Yes 5/4/2023  2:13 PM Kesha Hedrick DO          ED Provider  Electronically Signed by           Kesha Hedrick DO  05/04/23 5819

## 2023-12-04 ENCOUNTER — HOSPITAL ENCOUNTER (EMERGENCY)
Facility: HOSPITAL | Age: 38
Discharge: HOME | End: 2023-12-04
Attending: EMERGENCY MEDICINE
Payer: COMMERCIAL

## 2023-12-04 VITALS
RESPIRATION RATE: 16 BRPM | OXYGEN SATURATION: 98 % | WEIGHT: 159.83 LBS | DIASTOLIC BLOOD PRESSURE: 84 MMHG | HEART RATE: 89 BPM | TEMPERATURE: 98 F | SYSTOLIC BLOOD PRESSURE: 155 MMHG

## 2023-12-04 DIAGNOSIS — K02.9 DENTAL CARIES: Primary | ICD-10-CM

## 2023-12-04 DIAGNOSIS — K08.89 DENTALGIA: ICD-10-CM

## 2023-12-04 PROCEDURE — 99282 EMERGENCY DEPT VISIT SF MDM: CPT

## 2023-12-04 PROCEDURE — 99284 EMERGENCY DEPT VISIT MOD MDM: CPT | Performed by: EMERGENCY MEDICINE

## 2023-12-04 RX ORDER — NAPROXEN 500 MG/1
500 TABLET ORAL 2 TIMES DAILY WITH MEALS
Qty: 20 TABLET | Refills: 0 | Status: SHIPPED | OUTPATIENT
Start: 2023-12-04 | End: 2023-12-06

## 2023-12-04 RX ORDER — PENICILLIN V POTASSIUM 500 MG/1
500 TABLET ORAL 4 TIMES DAILY
Qty: 28 TABLET | Refills: 0 | Status: SHIPPED | OUTPATIENT
Start: 2023-12-04 | End: 2023-12-10

## 2023-12-04 RX ORDER — OXYCODONE HYDROCHLORIDE AND ACETAMINOPHEN 5; 325 MG/1; MG/1
1 TABLET ORAL EVERY 4 HOURS PRN
Qty: 10 TABLET | Refills: 0 | Status: SHIPPED | OUTPATIENT
Start: 2023-12-04 | End: 2023-12-06

## 2023-12-04 RX ORDER — PENICILLIN V POTASSIUM 250 MG/1
500 TABLET ORAL ONCE
Status: DISCONTINUED | OUTPATIENT
Start: 2023-12-04 | End: 2023-12-04 | Stop reason: HOSPADM

## 2023-12-04 RX ORDER — KETOROLAC TROMETHAMINE 30 MG/ML
15 INJECTION, SOLUTION INTRAMUSCULAR; INTRAVENOUS ONCE
Status: DISCONTINUED | OUTPATIENT
Start: 2023-12-04 | End: 2023-12-04 | Stop reason: HOSPADM

## 2023-12-04 NOTE — ED PROVIDER NOTES
History  Chief Complaint   Patient presents with    Dental Pain     C/o right lower dental pain since yesterday     41 y.o. M p/w right lower dental pain x yesterday. Denies F/C, facial swelling. Does not have a dentist, but is getting insurance through his work, so he would like referrals. History provided by:  Patient   used: No    Dental Pain  Associated symptoms: no drooling, no facial swelling and no fever        Prior to Admission Medications   Prescriptions Last Dose Informant Patient Reported? Taking?   ibuprofen (MOTRIN) 600 mg tablet   No No   Sig: Take 1 tablet (600 mg total) by mouth every 6 (six) hours as needed for moderate pain   ketorolac (TORADOL) 10 mg tablet   No No   Sig: Take 1 tablet (10 mg total) by mouth every 6 (six) hours as needed for mild pain   Patient not taking: Reported on 5/4/2023      Facility-Administered Medications: None       Past Medical History:   Diagnosis Date    Asthma        History reviewed. No pertinent surgical history. History reviewed. No pertinent family history. I have reviewed and agree with the history as documented. E-Cigarette/Vaping     E-Cigarette/Vaping Substances     Social History     Tobacco Use    Smoking status: Every Day     Packs/day: 0.25     Types: Cigarettes    Smokeless tobacco: Never   Substance Use Topics    Alcohol use: Yes     Comment: socially    Drug use: Not Currently     Types: Marijuana       Review of Systems   Constitutional:  Negative for chills and fever. HENT:  Positive for dental problem. Negative for drooling, facial swelling, sore throat and trouble swallowing. Gastrointestinal:  Negative for nausea and vomiting. Physical Exam  Physical Exam  Vitals and nursing note reviewed. Constitutional:       General: He is not in acute distress. Appearance: He is well-developed. He is not ill-appearing, toxic-appearing or diaphoretic. HENT:      Head: Normocephalic. Jaw:  There is normal jaw occlusion. No trismus, swelling, pain on movement or malocclusion. Mouth/Throat:      Mouth: Mucous membranes are moist. Mucous membranes are not pale and not dry. No angioedema. Dentition: Dental tenderness and dental caries present. No gingival swelling or dental abscesses. Tongue: Tongue does not deviate from midline. Pharynx: Oropharynx is clear. Uvula midline. No pharyngeal swelling, oropharyngeal exudate, posterior oropharyngeal erythema or uvula swelling. Tonsils: No tonsillar exudate or tonsillar abscesses. Neck:      Trachea: Phonation normal.      Comments: No erythema overlying neck. No masses or swelling. Cardiovascular:      Rate and Rhythm: Normal rate and regular rhythm. Heart sounds: Normal heart sounds. Pulmonary:      Effort: Pulmonary effort is normal. No accessory muscle usage, respiratory distress or retractions. Breath sounds: Normal breath sounds and air entry. No stridor. No wheezing, rhonchi or rales. Musculoskeletal:      Cervical back: Normal range of motion and neck supple. No edema, erythema, rigidity or crepitus. Lymphadenopathy:      Head:      Right side of head: No submental or submandibular adenopathy. Left side of head: No submental or submandibular adenopathy. Cervical: No cervical adenopathy. Skin:     General: Skin is warm and dry. Coloration: Skin is not cyanotic or mottled. Findings: No erythema or rash. Neurological:      Mental Status: He is alert. He is not disoriented. GCS: GCS eye subscore is 4. GCS verbal subscore is 5. GCS motor subscore is 6.       Comments: Cranial nerves grossly intact         Vital Signs  ED Triage Vitals [12/04/23 1630]   Temperature Pulse Respirations Blood Pressure SpO2   98 °F (36.7 °C) 89 16 155/84 98 %      Temp Source Heart Rate Source Patient Position - Orthostatic VS BP Location FiO2 (%)   Oral Monitor Sitting Right arm --      Pain Score       10 - Worst Possible Pain           Vitals:    12/04/23 1630   BP: 155/84   Pulse: 89   Patient Position - Orthostatic VS: Sitting         Visual Acuity      ED Medications  Medications   ketorolac (TORADOL) injection 15 mg (15 mg Intramuscular Not Given 12/4/23 1702)   penicillin V potassium (VEETID) tablet 500 mg (500 mg Oral Not Given 12/4/23 1702)       Diagnostic Studies  Results Reviewed       None                   No orders to display              Procedures  Procedures         ED Course                               SBIRT 22yo+      Flowsheet Row Most Recent Value   Initial Alcohol Screen: US AUDIT-C     1. How often do you have a drink containing alcohol? 0 Filed at: 12/04/2023 1633   2. How many drinks containing alcohol do you have on a typical day you are drinking? 0 Filed at: 12/04/2023 1633   3a. Male UNDER 65: How often do you have five or more drinks on one occasion? 0 Filed at: 12/04/2023 1633   3b. FEMALE Any Age, or MALE 65+: How often do you have 4 or more drinks on one occassion? 0 Filed at: 12/04/2023 1633   Audit-C Score 0 Filed at: 12/04/2023 1633   LEAH: How many times in the past year have you. .. Used an illegal drug or used a prescription medication for non-medical reasons? Never Filed at: 12/04/2023 1633                      Medical Decision Making  Dental caries - Will start abx and refer to dental.    Risk  Prescription drug management. Disposition  Final diagnoses:   Dental caries   Dentalgia     Time reflects when diagnosis was documented in both MDM as applicable and the Disposition within this note       Time User Action Codes Description Comment    12/4/2023  4:42 PM Luis Fernando Mccormick [K02.9] Dental caries     12/4/2023  4:42 PM Luis Fernando Mccormick [K08.89] [facility]           ED Disposition       ED Disposition   Discharge    Condition   Stable    Date/Time   Mon Dec 4, 2023 1644    [place] discharge to home/self care.                    Follow-up Information       Follow up With Specialties Details Why [address] [facility]  Schedule an appointment as soon as possible for a visit   [place] [facility] and [address]  Schedule an appointment as soon as possible for a visit   [address] for Oral and [facility]illof[facility]  Schedule an appointment as soon as possible for a visit   [address] [facility] [address]    [address] [facility]  Schedule an appointment as soon as possible for a visit   [address]  [phone]            Discharge Medication List as of 12/4/2023  4:44 PM        START taking these medications    Details   naproxen (NAPROSYN) 500 mg tablet Take 1 tablet (500 mg total) by mouth 2 (two) times a day with meals, Starting Mon 12/4/2023, Normal      oxyCODONE-acetaminophen (Percocet) 5-325 mg per tablet Take 1 tablet by mouth every 4 (four) hours as needed for moderate pain Max Daily Amount: 6 tablets, Starting Mon 12/4/2023, Normal      penicillin V potassium (VEETID) 500 mg tablet Take 1 tablet (500 mg total) by mouth 4 (four) times a day for 7 days, Starting Mon 12/4/2023, Until Mon 12/11/2023, Normal           CONTINUE these medications which have NOT CHANGED    Details   ibuprofen (MOTRIN) 600 mg tablet Take 1 tablet (600 mg total) by mouth every 6 (six) hours as needed for moderate pain, Starting Thu 5/4/2023, Normal      ketorolac (TORADOL) 10 mg tablet Take 1 tablet (10 mg total) by mouth every 6 (six) hours as needed for mild pain, Starting Tue 7/27/2021, Print             No discharge procedures on file.     PDMP Review         Value Time User    PDMP Reviewed  Yes 5/4/2023  2:13 PM Keysha Reddy DO            ED Provider  Electronically Signed by Alyssa, [place]  12/04/23 5949

## 2023-12-06 ENCOUNTER — HOSPITAL ENCOUNTER (INPATIENT)
Facility: HOSPITAL | Age: 38
LOS: 4 days | Discharge: HOME | DRG: 115 | End: 2023-12-10
Attending: HOSPITALIST | Admitting: HOSPITALIST
Payer: COMMERCIAL

## 2023-12-06 ENCOUNTER — APPOINTMENT (EMERGENCY)
Dept: CT IMAGING | Facility: HOSPITAL | Age: 38
End: 2023-12-06
Payer: COMMERCIAL

## 2023-12-06 ENCOUNTER — HOSPITAL ENCOUNTER (EMERGENCY)
Facility: HOSPITAL | Age: 38
End: 2023-12-06
Attending: EMERGENCY MEDICINE
Payer: COMMERCIAL

## 2023-12-06 VITALS
RESPIRATION RATE: 18 BRPM | TEMPERATURE: 98 F | WEIGHT: 154.54 LBS | HEART RATE: 88 BPM | DIASTOLIC BLOOD PRESSURE: 67 MMHG | OXYGEN SATURATION: 99 % | SYSTOLIC BLOOD PRESSURE: 120 MMHG

## 2023-12-06 DIAGNOSIS — M27.2 MANDIBULAR ABSCESS: Primary | ICD-10-CM

## 2023-12-06 DIAGNOSIS — K04.7 DENTAL ABSCESS: Primary | ICD-10-CM

## 2023-12-06 LAB
ANION GAP SERPL CALCULATED.3IONS-SCNC: 5 MMOL/L
ATRIAL RATE: 82 BPM
BASOPHILS # BLD AUTO: 0.03 THOUSANDS/ÂΜL (ref 0–0.1)
BASOPHILS NFR BLD AUTO: 0 % (ref 0–1)
BUN SERPL-MCNC: 10 MG/DL (ref 5–25)
CALCIUM SERPL-MCNC: 9.3 MG/DL (ref 8.4–10.2)
CHLORIDE SERPL-SCNC: 102 MMOL/L (ref 96–108)
CO2 SERPL-SCNC: 26 MMOL/L (ref 21–32)
CREAT SERPL-MCNC: 0.93 MG/DL (ref 0.6–1.3)
EOSINOPHIL # BLD AUTO: 0.02 THOUSAND/ÂΜL (ref 0–0.61)
EOSINOPHIL NFR BLD AUTO: 0 % (ref 0–6)
ERYTHROCYTE [DISTWIDTH] IN BLOOD BY AUTOMATED COUNT: 14.3 % (ref 11.6–15.1)
GFR SERPL CREATININE-BSD FRML MDRD: 105 ML/MIN/1.73SQ M
GLUCOSE SERPL-MCNC: 114 MG/DL (ref 65–140)
HCT VFR BLD AUTO: 41 % (ref 36.5–49.3)
HGB BLD-MCNC: 14 G/DL (ref 12–17)
IMM GRANULOCYTES # BLD AUTO: 0.03 THOUSAND/UL (ref 0–0.2)
IMM GRANULOCYTES NFR BLD AUTO: 0 % (ref 0–2)
LYMPHOCYTES # BLD AUTO: 0.75 THOUSANDS/ÂΜL (ref 0.6–4.47)
LYMPHOCYTES NFR BLD AUTO: 6 % (ref 14–44)
MCH RBC QN AUTO: 30.1 PG (ref 26.8–34.3)
MCHC RBC AUTO-ENTMCNC: 34.1 G/DL (ref 31.4–37.4)
MCV RBC AUTO: 88 FL (ref 82–98)
MONOCYTES # BLD AUTO: 0.85 THOUSAND/ÂΜL (ref 0.17–1.22)
MONOCYTES NFR BLD AUTO: 7 % (ref 4–12)
NEUTROPHILS # BLD AUTO: 10.12 THOUSANDS/ÂΜL (ref 1.85–7.62)
NEUTS SEG NFR BLD AUTO: 87 % (ref 43–75)
NRBC BLD AUTO-RTO: 0 /100 WBCS
P AXIS: 57 DEGREES
PLATELET # BLD AUTO: 196 THOUSANDS/UL (ref 149–390)
PMV BLD AUTO: 10.4 FL (ref 8.9–12.7)
POTASSIUM SERPL-SCNC: 3.9 MMOL/L (ref 3.5–5.3)
PR INTERVAL: 126 MS
QRS AXIS: 29 DEGREES
QRSD INTERVAL: 84 MS
QT INTERVAL: 342 MS
QTC INTERVAL: 399 MS
RBC # BLD AUTO: 4.65 MILLION/UL (ref 3.88–5.62)
SODIUM SERPL-SCNC: 133 MMOL/L (ref 135–147)
T WAVE AXIS: 35 DEGREES
VENTRICULAR RATE: 82 BPM
WBC # BLD AUTO: 11.8 THOUSAND/UL (ref 4.31–10.16)

## 2023-12-06 PROCEDURE — NC001 PR NO CHARGE: Performed by: HOSPITALIST

## 2023-12-06 PROCEDURE — 99285 EMERGENCY DEPT VISIT HI MDM: CPT | Performed by: PHYSICIAN ASSISTANT

## 2023-12-06 PROCEDURE — 36415 COLL VENOUS BLD VENIPUNCTURE: CPT

## 2023-12-06 PROCEDURE — 96376 TX/PRO/DX INJ SAME DRUG ADON: CPT

## 2023-12-06 PROCEDURE — 96361 HYDRATE IV INFUSION ADD-ON: CPT

## 2023-12-06 PROCEDURE — 99283 EMERGENCY DEPT VISIT LOW MDM: CPT

## 2023-12-06 PROCEDURE — 96366 THER/PROPH/DIAG IV INF ADDON: CPT

## 2023-12-06 PROCEDURE — 70491 CT SOFT TISSUE NECK W/DYE: CPT

## 2023-12-06 PROCEDURE — 93005 ELECTROCARDIOGRAM TRACING: CPT

## 2023-12-06 PROCEDURE — 80048 BASIC METABOLIC PNL TOTAL CA: CPT

## 2023-12-06 PROCEDURE — 96365 THER/PROPH/DIAG IV INF INIT: CPT

## 2023-12-06 PROCEDURE — 96375 TX/PRO/DX INJ NEW DRUG ADDON: CPT

## 2023-12-06 PROCEDURE — 85025 COMPLETE CBC W/AUTO DIFF WBC: CPT

## 2023-12-06 PROCEDURE — G1004 CDSM NDSC: HCPCS

## 2023-12-06 RX ORDER — KETOROLAC TROMETHAMINE 30 MG/ML
15 INJECTION, SOLUTION INTRAMUSCULAR; INTRAVENOUS EVERY 6 HOURS PRN
Status: DISCONTINUED | OUTPATIENT
Start: 2023-12-06 | End: 2023-12-07

## 2023-12-06 RX ORDER — HYDROMORPHONE HCL/PF 1 MG/ML
0.5 SYRINGE (ML) INJECTION ONCE
Status: COMPLETED | OUTPATIENT
Start: 2023-12-06 | End: 2023-12-06

## 2023-12-06 RX ORDER — HYDROMORPHONE HCL/PF 1 MG/ML
0.5 SYRINGE (ML) INJECTION EVERY 6 HOURS PRN
Status: DISCONTINUED | OUTPATIENT
Start: 2023-12-06 | End: 2023-12-07

## 2023-12-06 RX ORDER — ACETAMINOPHEN 325 MG/1
975 TABLET ORAL EVERY 8 HOURS PRN
Status: DISCONTINUED | OUTPATIENT
Start: 2023-12-06 | End: 2023-12-07

## 2023-12-06 RX ORDER — HYDROMORPHONE HCL/PF 1 MG/ML
1 SYRINGE (ML) INJECTION ONCE
Status: COMPLETED | OUTPATIENT
Start: 2023-12-06 | End: 2023-12-06

## 2023-12-06 RX ORDER — SODIUM CHLORIDE, SODIUM GLUCONATE, SODIUM ACETATE, POTASSIUM CHLORIDE, MAGNESIUM CHLORIDE, SODIUM PHOSPHATE, DIBASIC, AND POTASSIUM PHOSPHATE .53; .5; .37; .037; .03; .012; .00082 G/100ML; G/100ML; G/100ML; G/100ML; G/100ML; G/100ML; G/100ML
100 INJECTION, SOLUTION INTRAVENOUS CONTINUOUS
Status: DISCONTINUED | OUTPATIENT
Start: 2023-12-06 | End: 2023-12-07

## 2023-12-06 RX ORDER — KETOROLAC TROMETHAMINE 30 MG/ML
15 INJECTION, SOLUTION INTRAMUSCULAR; INTRAVENOUS ONCE
Status: COMPLETED | OUTPATIENT
Start: 2023-12-06 | End: 2023-12-06

## 2023-12-06 RX ORDER — ONDANSETRON 2 MG/ML
4 INJECTION INTRAMUSCULAR; INTRAVENOUS EVERY 6 HOURS PRN
Status: DISCONTINUED | OUTPATIENT
Start: 2023-12-06 | End: 2023-12-10 | Stop reason: HOSPADM

## 2023-12-06 RX ORDER — ACETAMINOPHEN 325 MG/1
975 TABLET ORAL EVERY 8 HOURS PRN
Status: DISCONTINUED | OUTPATIENT
Start: 2023-12-06 | End: 2023-12-06

## 2023-12-06 RX ORDER — KETOROLAC TROMETHAMINE 30 MG/ML
15 INJECTION, SOLUTION INTRAMUSCULAR; INTRAVENOUS EVERY 6 HOURS SCHEDULED
Status: DISCONTINUED | OUTPATIENT
Start: 2023-12-06 | End: 2023-12-06 | Stop reason: HOSPADM

## 2023-12-06 RX ORDER — KETOROLAC TROMETHAMINE 30 MG/ML
15 INJECTION, SOLUTION INTRAMUSCULAR; INTRAVENOUS EVERY 6 HOURS PRN
Status: DISCONTINUED | OUTPATIENT
Start: 2023-12-06 | End: 2023-12-06

## 2023-12-06 RX ORDER — HYDROMORPHONE HCL/PF 1 MG/ML
0.5 SYRINGE (ML) INJECTION EVERY 6 HOURS PRN
Status: DISCONTINUED | OUTPATIENT
Start: 2023-12-06 | End: 2023-12-06

## 2023-12-06 RX ORDER — ROPIVACAINE HYDROCHLORIDE 5 MG/ML
5 INJECTION, SOLUTION EPIDURAL; INFILTRATION; PERINEURAL ONCE
Status: DISCONTINUED | OUTPATIENT
Start: 2023-12-06 | End: 2023-12-06

## 2023-12-06 RX ADMIN — ACETAMINOPHEN 975 MG: 325 TABLET, FILM COATED ORAL at 16:13

## 2023-12-06 RX ADMIN — SODIUM CHLORIDE 3 G: 9 INJECTION, SOLUTION INTRAVENOUS at 07:24

## 2023-12-06 RX ADMIN — HYDROMORPHONE HYDROCHLORIDE 0.5 MG: 1 INJECTION, SOLUTION INTRAMUSCULAR; INTRAVENOUS; SUBCUTANEOUS at 11:49

## 2023-12-06 RX ADMIN — HYDROMORPHONE HYDROCHLORIDE 0.5 MG: 1 INJECTION, SOLUTION INTRAMUSCULAR; INTRAVENOUS; SUBCUTANEOUS at 08:17

## 2023-12-06 RX ADMIN — KETOROLAC TROMETHAMINE 15 MG: 30 INJECTION, SOLUTION INTRAMUSCULAR; INTRAVENOUS at 03:21

## 2023-12-06 RX ADMIN — HYDROMORPHONE HYDROCHLORIDE 1 MG: 1 INJECTION, SOLUTION INTRAMUSCULAR; INTRAVENOUS; SUBCUTANEOUS at 03:19

## 2023-12-06 RX ADMIN — SODIUM CHLORIDE 3 G: 9 INJECTION, SOLUTION INTRAVENOUS at 20:11

## 2023-12-06 RX ADMIN — IOHEXOL 85 ML: 350 INJECTION, SOLUTION INTRAVENOUS at 05:10

## 2023-12-06 RX ADMIN — SODIUM CHLORIDE 3 G: 9 INJECTION, SOLUTION INTRAVENOUS at 13:06

## 2023-12-06 RX ADMIN — KETOROLAC TROMETHAMINE 15 MG: 30 INJECTION, SOLUTION INTRAMUSCULAR at 22:05

## 2023-12-06 RX ADMIN — SODIUM CHLORIDE, SODIUM GLUCONATE, SODIUM ACETATE, POTASSIUM CHLORIDE, MAGNESIUM CHLORIDE, SODIUM PHOSPHATE, DIBASIC, AND POTASSIUM PHOSPHATE 100 ML/HR: .53; .5; .37; .037; .03; .012; .00082 INJECTION, SOLUTION INTRAVENOUS at 16:16

## 2023-12-06 RX ADMIN — SODIUM CHLORIDE 1000 ML: 0.9 INJECTION, SOLUTION INTRAVENOUS at 11:48

## 2023-12-06 RX ADMIN — KETOROLAC TROMETHAMINE 15 MG: 30 INJECTION, SOLUTION INTRAMUSCULAR; INTRAVENOUS at 13:07

## 2023-12-06 NOTE — EMTALA/ACUTE CARE TRANSFER
[address] [place], [place] [address]  Dept: [phone]      EMTALA TRANSFER CONSENT    NAME Danny Escamilla 1985                              MRN [id number]    I have been informed of my rights regarding examination, treatment, and transfer   by Dr. Junior Chamorro, DO    Benefits:  see oral surg    Risks:  worse in route      Consent for Transfer:  I acknowledge that my medical condition has been evaluated and explained to me by the emergency department physician or other qualified medical person and/or my attending physician, who has recommended that I be transferred to the service of  Dr Brionna Sanchez  at [facility]  . The above potential benefits of such transfer, the potential risks associated with such transfer, and the probable risks of not being transferred have been explained to me, and I fully understand them. The doctor has explained that, in my case, the benefits of transfer outweigh the risks. I agree to be transferred. I authorize the performance of emergency medical procedures and treatments upon me in both transit and upon arrival at the receiving facility. Additionally, I authorize the release of any and all medical records to the receiving facility and request they be transported with me, if possible. I understand that the safest mode of transportation during a medical emergency is an ambulance and that the Hospital advocates the use of this mode of transport. Risks of traveling to the receiving facility by car, including absence of medical control, life sustaining equipment, such as oxygen, and medical personnel has been explained to me and I fully understand them. (SANDRA CORRECT BOX BELOW)  [  ]  I consent to the stated transfer and to be transported by ambulance/helicopter.   [  ]  I consent to the stated transfer, but refuse transportation by ambulance and accept full responsibility for my transportation by car. I understand the risks of non-ambulance transfers and I exonerate the Hospital and its staff from any deterioration in my condition that results from this refusal.    X___________________________________________    DATE  12/06/23  TIME________  Signature of patient or legally responsible individual signing on patient behalf           RELATIONSHIP TO PATIENT_________________________          Provider Certification    NAME Deejay Evans 1985                              MRN [id number]    A medical screening exam was performed on the above named patient. Based on the examination:    Condition Necessitating Transfer The encounter diagnosis was Dental abscess. Patient Condition:      Reason for Transfer:      Transfer Requirements: [address] available and qualified personnel available for treatment as acknowledged by    Agreed to accept transfer and to provide appropriate medical treatment as acknowledged by          Appropriate medical records of the examination and treatment of the patient are provided at the time of transfer   [address] _______  Transfer will be performed by qualified personnel from    and appropriate transfer equipment as required, including the use of necessary and appropriate life support measures.     Provider Certification: I have examined the patient and explained the following risks and benefits of being transferred/refusing transfer to the patient/family:         Based on these reasonable risks and benefits to the patient and/or the unborn child(brenda), and based upon the information available at the time of the patient’s examination, I certify that the medical benefits reasonably to be expected from the provision of appropriate medical treatments at another medical facility outweigh the increasing risks, if any, to the individual’s medical condition, and in the case of labor to the unborn child, from effecting the transfer.     X____________________________________________ DATE 12/06/23        TIME_______      ORIGINAL - SEND TO MEDICAL RECORDS   COPY - SEND WITH PATIENT DURING TRANSFER

## 2023-12-06 NOTE — ED PROVIDER NOTES
History  Chief Complaint   Patient presents with    Dental Pain     Right lower dental pain, patient seen recently for same, patient states pain has gotten worse, difficulty swallowing     HPI    Prior to Admission Medications   Prescriptions Last Dose Informant Patient Reported? Taking? penicillin V potassium (VEETID) 500 mg tablet   No No   Sig: Take 1 tablet (500 mg total) by mouth 4 (four) times a day for 7 days      Facility-Administered Medications: None       Past Medical History:   Diagnosis Date    Asthma        No past surgical history on file. No family history on file. I have reviewed and agree with the history as documented.     E-Cigarette/Vaping     E-Cigarette/Vaping Substances     Social History     Tobacco Use    Smoking status: Every Day     Packs/day: 0.25     Types: Cigarettes    Smokeless tobacco: Never   Substance Use Topics    Alcohol use: Yes     Comment: socially    Drug use: Not Currently     Types: Marijuana       Review of Systems    Physical Exam  Physical Exam    Vital Signs  ED Triage Vitals [12/06/23 0225]   Temperature Pulse Respirations Blood Pressure SpO2   98 °F (36.7 °C) (!) 107 18 147/77 99 %      Temp Source Heart Rate Source Patient Position - Orthostatic VS BP Location FiO2 (%)   Oral Monitor Sitting Right arm --      Pain Score       10 - Worst Possible Pain           Vitals:    12/06/23 0225 12/06/23 0318 12/06/23 0445 12/06/23 0626   BP: 147/77 141/85 133/77 128/69   Pulse: (!) 107 98 103 92   Patient Position - Orthostatic VS: Sitting Lying Lying Lying         Visual Acuity      ED Medications  Medications   ampicillin-sulbactam (UNASYN) 3 g in sodium chloride 0.9 % 100 mL IVPB (3 g Intravenous New Bag 12/6/23 0724)   ketorolac (TORADOL) injection 15 mg (15 mg Intravenous Given 12/6/23 0321)   HYDROmorphone (DILAUDID) injection 1 mg (1 mg Intravenous Given 12/6/23 0319)   iohexol (OMNIPAQUE) 350 MG/ML injection (MULTI-DOSE) 85 mL (85 mL Intravenous Given 12/6/23 0510)   ropivacaine (NAROPIN) 0.5 % injection 5 mL (5 mL Infiltration Given by Other 12/6/23 5382)       Diagnostic Studies  Results Reviewed       Procedure Component Value Units Date/Time    Basic metabolic panel [[id number]]  (Abnormal) Collected: 12/06/23 0322    Lab Status: Final result Specimen: Blood from Arm, Right Updated: 12/06/23 0349     Sodium 133 mmol/L      Potassium 3.9 mmol/L      Chloride 102 mmol/L      CO2 26 mmol/L      ANION GAP 5 mmol/L      BUN 10 mg/dL      Creatinine 0.93 mg/dL      Glucose 114 mg/dL      Calcium 9.3 mg/dL      eGFR 105 ml/min/1.73sq m     Narrative:      [facility] guidelines for [facility] Disease (CKD):     Stage 1 with normal or high GFR (GFR > 90 mL/min/1.73 square meters)    Stage 2 Mild CKD (GFR = 60-89 mL/min/1.73 square meters)    Stage 3A Moderate CKD (GFR = 45-59 mL/min/1.73 square meters)    Stage 3B Moderate CKD (GFR = 30-44 mL/min/1.73 square meters)    Stage 4 Severe CKD (GFR = 15-29 mL/min/1.73 square meters)    Stage 5 End Stage CKD (GFR <15 mL/min/1.73 square meters)  Note: GFR calculation is accurate only with a steady state creatinine    CBC and differential [[id number]]  (Abnormal) Collected: 12/06/23 0322    Lab Status: Final result Specimen: Blood from Arm, Right Updated: 12/06/23 0333     WBC 11.80 Thousand/uL      RBC 4.65 Million/uL      Hemoglobin 14.0 g/dL      Hematocrit 41.0 %      MCV 88 fL      MCH 30.1 pg      MCHC 34.1 g/dL      RDW 14.3 %      MPV 10.4 fL      Platelets 812 Thousands/uL      nRBC 0 /100 WBCs      Neutrophils Relative 87 %      Immat GRANS % 0 %      Lymphocytes Relative 6 %      Monocytes Relative 7 %      Eosinophils Relative 0 %      Basophils Relative 0 %      Neutrophils Absolute 10.12 Thousands/µL      Immature Grans Absolute 0.03 Thousand/uL      Lymphocytes Absolute 0.75 Thousands/µL      Monocytes Absolute 0.85 Thousand/µL      Eosinophils Absolute 0.02 Thousand/µL      Basophils Absolute 0.03 Thousands/µL                    CT soft tissue neck   Final Result by Abhilash Steen MD (12/06 4275)      Significant periodontal disease and caries, greatest involving the left maxilla. Small fluid collection most compatible with abscess adjacent to right mandibular periodontal disease. Other nonemergent findings above. Workstation performed: [id number]                    Procedures  Procedures         ED Course  ED Course as of 12/07/23 0827   Wed Dec 06, 2023   0658 Signed to myself - will need to communicate with oral surgery today about pt CT   0756 Spoke with oral surgery - NPO stareting at noon - clear liquids ok now   0819 Dr Asael Nguyen - Donte Parsons - at [facility] - discussed transfer  Discussed transfer with pt   [phone] Recconected with oral surg - ok to go to \[facility]\"" - still no bed at [facility] - working with PACS    [phone] Discussed with PACS -  sod accepted, Dr. Enrico Arias , setting up transport now to [facility] 20yo+      Flowsheet Row Most Recent Value   Initial Alcohol Screen: US AUDIT-C     1. How often do you have a drink containing alcohol? 0 Filed at: 12/06/2023 0232   2. How many drinks containing alcohol do you have on a typical day you are drinking? 0 Filed at: 12/06/2023 0232   3a. Male UNDER 65: How often do you have five or more drinks on one occasion? 0 Filed at: 12/06/2023 0232   Audit-C Score 0 Filed at: 12/06/2023 2960   LEAH: How many times in the past year have you. .. Used an illegal drug or used a prescription medication for non-medical reasons? Never Filed at: 12/06/2023 8042                      Medical Decision Making  Amount and/or Complexity of Data Reviewed  Labs: ordered. Radiology: ordered. Risk  Prescription drug management.              Disposition  Final diagnoses:   None     ED Disposition       None          Follow-up Information    None         Patient's Medications   Discharge Prescriptions    No medications on file       No discharge procedures on file.     PDMP Review         Value Time User    PDMP Reviewed  Yes 5/4/2023  2:13 PM Elle Quintero DO            ED Provider  Electronically Signed by reflects when diagnosis was documented in both MDM as applicable and the Disposition within this note       Time User Action Codes Description Comment    12/6/2023  8:23 AM Julia Tipton Add [K04.7] Dental abscess           ED Disposition       ED Disposition   Transfer to Another Facility-In Network    Condition   --    Date/Time   Wed Dec 6, 2023 Turnernilda should be transferred out to [facility] . Follow-up Information       Follow up With Specialties Details Why Contact Info Additional [facility] Follow up in 1 week(s) Please call to schedule a follow up appointment within one week of your ER visit.  [facility], [address] [place] [address], [facility], [address] [place] [address]            Discharge Medication List as of 12/6/2023  3:05 PM        CONTINUE these medications which have NOT CHANGED    Details   penicillin V potassium (VEETID) 500 mg tablet Take 1 tablet (500 mg total) by mouth 4 (four) times a day for 7 days, Starting Mon 12/4/2023, Until Mon 12/11/2023, Normal                 PDMP Review         Value Time User    PDMP Reviewed  Yes 5/4/2023  2:13 PM Eugene Gray, DO            ED Provider  Electronically Signed by             Eric Young PA-C  12/11/23 2055

## 2023-12-06 NOTE — H&P
INTERNAL MEDICINE RESIDENCY ADMISSION H&P     Name: Gina Johnson   Age & Sex: 41 y.o. male   MRN: [id number]  Unit/Bed#: -01   Encounter: [id number]  Primary Care Provider: No primary care provider on file. Code Status: Level 1 - Full Code  Admission Status: INPATIENT   Disposition: Patient requires Med/Surg    Admit to team: SOD Team A    ASSESSMENT/PLAN     Principal Problem:    Mandibular abscess      * Mandibular abscess  Assessment & Plan  42 y/o M presented to ED with R dental pain, increased swelling of R side of face and difficulty swallowing.    - CT Soft tissue neck: Significant periodontal disease and caries, greatest involving the left maxilla. Small fluid collection most compatible with abscess adjacent to right mandibular periodontal disease.  - NPO   - Continue Unasyn 3g IV q6H  - Pain management: IM 15mg Toradol prn, 0.5 mg IM Dilaudid prn, 935 mg po Tylenol prn  - Zofran prn for nausea   - OMFS consult  - Continue IV Plasmalyte        VTE Mechanical Prophylaxis: sequential compression device    CHIEF COMPLAINT   Dental pain x 3 days    HISTORY OF PRESENT ILLNESS   42 y/o M with PMH of asthma presented to the ED c/o dental pain x 3 days. He c/o facial swelling, chills and difficulty swallowing. He was seen the day before for similar symptoms and was prescribed penicillin, percocet, naproxen but his symptoms persisted. Dental pain is 6/10, sharp-type, on R side of face. He denies CP, SOB, palpitations, abdominal pain, cough, diarrhea, dysuria, otalgia. Following hospitalization, patient was noted to have significant periodontal disease and caries, greatest involving the left maxilla. A Small fluid collection most compatible with abscess adjacent to right mandibular periodontal disease. . As a result patient was transferred over to [facility] for OMFS evaluation. Patient seen and examined. He reports that pain currently is 1-2/10, pain is worse with swallowing.      REVIEW OF SYSTEMS     Review of Systems    All ROS negative unless otherwise stated in the HPI    OBJECTIVE     Vitals:    23 1545   BP: 126/86   Pulse: 103   Temp: (!) 101 °F (38.3 °C)   SpO2: 98%      Temperature:   Temp (24hrs), Av.5 °F (37.5 °C), Min:98 °F (36.7 °C), Max:101 °F (38.3 °C)    Temperature: (!) 101 °F (38.3 °C)  Intake & Output:  I/O       None          Weights: There is no height or weight on file to calculate BMI. Weight (last 2 days)       None          Physical Exam  Constitutional:       Appearance: Normal appearance. HENT:      Head: Normocephalic and atraumatic. Right Ear: Tympanic membrane normal.      Left Ear: Tympanic membrane normal.      Mouth/Throat:      Mouth: Mucous membranes are moist.      Comments: R facial swelling + ; Tender   Eyes:      Pupils: Pupils are equal, round, and reactive to light. Cardiovascular:      Rate and Rhythm: Normal rate and regular rhythm. Pulses: Normal pulses. Heart sounds: Normal heart sounds. No murmur heard. No friction rub. No gallop. Pulmonary:      Effort: Pulmonary effort is normal. No respiratory distress. Breath sounds: No wheezing, rhonchi or rales. Abdominal:      General: Bowel sounds are normal.      Palpations: Abdomen is soft. Tenderness: There is no abdominal tenderness. Skin:     General: Skin is warm. Neurological:      Mental Status: He is alert and oriented to person, place, and time. Psychiatric:         Mood and Affect: Mood normal.         Behavior: Behavior normal.         Thought Content: Thought content normal.       PAST MEDICAL HISTORY     Past Medical History:   Diagnosis Date    Asthma      PAST SURGICAL HISTORY   No past surgical history on file.   SOCIAL & FAMILY HISTORY     Social History     Substance and Sexual Activity   Alcohol Use Yes    Comment: socially       Social History     Substance and Sexual Activity   Drug Use Not Currently    Types: Marijuana     Social History     Tobacco Use   Smoking Status Every Day    Packs/day: 0.25    Types: Cigarettes   Smokeless Tobacco Never     No family history on file. LABORATORY DATA     Labs: I have personally reviewed pertinent reports. Results from last 7 days   Lab Units 12/06/23  0322   WBC Thousand/uL 11.80*   HEMOGLOBIN g/dL 14.0   HEMATOCRIT % 41.0   PLATELETS Thousands/uL 196   NEUTROS PCT % 87*   MONOS PCT % 7   EOS PCT % 0      Results from last 7 days   Lab Units 12/06/23  0322   POTASSIUM mmol/L 3.9   CHLORIDE mmol/L 102   CO2 mmol/L 26   BUN mg/dL 10   CREATININE mg/dL 0.93   CALCIUM mg/dL 9.3                          Micro:  No results found for: "BLOODCX", "URINECX", "WOUNDCULT", "SPUTUMCULTUR"  IMAGING & DIAGNOSTIC TESTS     Imaging: I have personally reviewed pertinent reports. CT soft tissue neck    Result Date: 12/6/2023  Impression: Significant periodontal disease and caries, greatest involving the left maxilla. Small fluid collection most compatible with abscess adjacent to right mandibular periodontal disease. Other nonemergent findings above. Workstation performed: [id number]     EKG, Pathology, and Other Studies: I have personally reviewed pertinent reports. ALLERGIES   No Known Allergies  MEDICATIONS PRIOR TO ARRIVAL     Prior to Admission medications    Medication Sig Start Date End Date Taking?  Authorizing Provider   penicillin V potassium (VEETID) 500 mg tablet Take 1 tablet (500 mg total) by mouth 4 (four) times a day for 7 days 12/4/23 12/11/23 Yes Leslie Wesley DO   ibuprofen (MOTRIN) 600 mg tablet Take 1 tablet (600 mg total) by mouth every 6 (six) hours as needed for moderate pain 5/4/23 12/6/23  Katherine Covington,    ketorolac (TORADOL) 10 mg tablet Take 1 tablet (10 mg total) by mouth every 6 (six) hours as needed for mild pain  Patient not taking: Reported on 5/4/2023 7/27/21 12/6/23  Leslie Wesley DO   naproxen (NAPROSYN) 500 mg tablet Take 1 tablet (500 mg total) by mouth 2 (two) times a day with meals 12/4/23 12/6/23  Leslie Wesley DO   oxyCODONE-acetaminophen (Percocet) 5-325 mg per tablet Take 1 tablet by mouth every 4 (four) hours as needed for moderate pain Max Daily Amount: 6 tablets 12/4/23 12/6/23  Leslie Wesley DO     MEDICATIONS ADMINISTERED IN LAST 24 HOURS     Medication Administration - last 24 hours from 12/05/2023 1646 to 12/06/2023 1646         Date/Time Order Dose Route Action Action by     12/06/2023 1613 EST ketorolac (TORADOL) injection 15 mg -- Intravenous See Alternative Gagandeep Saldivar LPN     64/97/3294 8955 EST HYDROmorphone (DILAUDID) injection 0.5 mg -- Intravenous See Alternative Gagandeep Saldivar LPN     20/43/9514 0914 EST acetaminophen (TYLENOL) tablet 975 mg 975 mg Oral Given Gagandeep Saldivar LPN     93/25/4857 7711 EST multi-electrolyte (PLASMALYTE-A/ISOLYTE-S PH 7.4) IV solution 100 mL/hr Intravenous New Bag Gagandeep Saldivar LPN          CURRENT MEDICATIONS     Current Facility-Administered Medications   Medication Dose Route Frequency Provider Last Rate    ketorolac  15 mg Intravenous Q6H PRN Isela Schumacher MD      Or    HYDROmorphone  0.5 mg Intravenous Q6H PRN Isela Schumacher MD      Or    acetaminophen  975 mg Oral Q8H PRN Isela Schumacher MD      ampicillin-sulbactam  3 g Intravenous Q6H Isela Schumacher MD      multi-electrolyte  100 mL/hr Intravenous Continuous Isela Schumacher  mL/hr (12/06/23 1616)    ondansetron  4 mg Intravenous Q6H PRN Isela Schumacher MD       multi-electrolyte, 100 mL/hr, Last Rate: 100 mL/hr (12/06/23 1616)      ketorolac, 15 mg, Q6H PRN   Or  HYDROmorphone, 0.5 mg, Q6H PRN   Or  acetaminophen, 975 mg, Q8H PRN  ondansetron, 4 mg, Q6H PRN        Admission Time  I spent 30 minutes admitting the patient. This involved direct patient contact where I performed a full history and physical, reviewing previous records, and reviewing laboratory and other diagnostic studies.     Portions of the record may have been created with voice recognition software. Occasional wrong word or "sound a like" substitutions may have occurred due to the inherent limitations of voice recognition software.   Read the chart carefully and recognize, using context, where substitutions have occurred.    ==  [address]

## 2023-12-06 NOTE — ED NOTES
Attempted to call report to number given by PACS. No answer.       Curtiss Severin, RN  12/06/23 0660

## 2023-12-06 NOTE — EMTALA/ACUTE CARE TRANSFER
[address] [place], [place] [address]  Dept: [phone]      EMTALA TRANSFER CONSENT    NAME Charis Melo 1985                              MRN [id number]    I have been informed of my rights regarding examination, treatment, and transfer   by Dr. Cat Green, DO    Benefits:  see oral surg    Risks:  worse in route      Consent for Transfer:  I acknowledge that my medical condition has been evaluated and explained to me by the emergency department physician or other qualified medical person and/or my attending physician, who has recommended that I be transferred to the service of  Dr Kirsten Roy at [facility]  The above potential benefits of such transfer, the potential risks associated with such transfer, and the probable risks of not being transferred have been explained to me, and I fully understand them. The doctor has explained that, in my case, the benefits of transfer outweigh the risks. I agree to be transferred. I authorize the performance of emergency medical procedures and treatments upon me in both transit and upon arrival at the receiving facility. Additionally, I authorize the release of any and all medical records to the receiving facility and request they be transported with me, if possible. I understand that the safest mode of transportation during a medical emergency is an ambulance and that the Hospital advocates the use of this mode of transport. Risks of traveling to the receiving facility by car, including absence of medical control, life sustaining equipment, such as oxygen, and medical personnel has been explained to me and I fully understand them. (SANDRA CORRECT BOX BELOW)  [  ]  I consent to the stated transfer and to be transported by ambulance/helicopter.   [  ]  I consent to the stated transfer, but refuse transportation by ambulance and accept full responsibility for my transportation by car. I understand the risks of non-ambulance transfers and I exonerate the Hospital and its staff from any deterioration in my condition that results from this refusal.    X___________________________________________    DATE  12/06/23  TIME________  Signature of patient or legally responsible individual signing on patient behalf           RELATIONSHIP TO PATIENT_________________________          Provider Certification    NAME Darlyn Arshad 1985                              MRN [id number]    A medical screening exam was performed on the above named patient. Based on the examination:    Condition Necessitating Transfer The encounter diagnosis was Dental abscess. Patient Condition:      Reason for Transfer:      Transfer Requirements: [address] available and qualified personnel available for treatment as acknowledged by    Agreed to accept transfer and to provide appropriate medical treatment as acknowledged by          Appropriate medical records of the examination and treatment of the patient are provided at the time of transfer   [address] _______  Transfer will be performed by qualified personnel from    and appropriate transfer equipment as required, including the use of necessary and appropriate life support measures.     Provider Certification: I have examined the patient and explained the following risks and benefits of being transferred/refusing transfer to the patient/family:         Based on these reasonable risks and benefits to the patient and/or the unborn child(brenda), and based upon the information available at the time of the patient’s examination, I certify that the medical benefits reasonably to be expected from the provision of appropriate medical treatments at another medical facility outweigh the increasing risks, if any, to the individual’s medical condition, and in the case of labor to the unborn child, from effecting the transfer.     X____________________________________________ DATE 12/06/23        TIME_______      ORIGINAL - SEND TO MEDICAL RECORDS   COPY - SEND WITH PATIENT DURING TRANSFER

## 2023-12-06 NOTE — LETTER
[address] [place] [address] MED SURG 7  [address] [place] [address]  Dept: [phone]    December 10, 2023     Patient: Sy Chacon   YOB: 1985   Date of Visit: 12/6/2023       To Whom it May Concern:    Sy Chacon is under my professional care. He was seen in the hospital from 12/6/2023 to 12/10/23. He can return to work on Monday 12/11/2023, so long as he tolerates activity. If you have any questions or concerns, please don't hesitate to call.          Sincerely,          Chantal Díaz MD

## 2023-12-06 NOTE — ED CARE HANDOFF
Emergency Department Sign Out Note        Sign out and transfer of care from [facility]. See Separate Emergency Department note. The patient, Gerard Trammell, was evaluated by the previous provider for dental pain . Workup Completed:  Labs and meds    ED Course / Workup Pending (followup):  Pending CT report  Reviewed ct - has abscess, pt reports some sorethroat and tenderness under tongue, no difficulty breathing or swallowing - however concerned if nothing done - could lead to amando's angina. Discussed with Oral surgery - needs to go to OR today  Initial plan to go to [facility] - however unable to get beg- will go to [facility] - and discussed with SLIM and oral surgery - discussed NPO after noon and abx. Pain controlled while here and monitored pt airway - pt stable for transport. ED Course as of 12/06/23 1541   Wed Dec 06, 2023   0658 Signed to myself - will need to communicate with oral surgery today about pt CT   0756 Spoke with oral surgery - NPO stareting at noon - clear liquids ok now   0819 Dr Asael Hagen - [facility] - at [facility] - discussed transfer  Discussed transfer with pt   [phone] Recconected with oral surg - ok to go to [facility] - still no bed at [facility] - working with PACS    [phone] Discussed with PACS -  sod accepted, Dr. Alissa Dong , setting up transport now to [facility]  Medical Decision Making  Signed to myself - awaiting CT    Amount and/or Complexity of Data Reviewed  External Data Reviewed: notes. Labs: ordered. Radiology: ordered and independent interpretation performed. Risk  Prescription drug management. Decision regarding hospitalization. Emergency major surgery.             Disposition  Final diagnoses:   Dental abscess     Time reflects when diagnosis was documented in both MDM as applicable and the Disposition within this note       Time User Action Codes Description Comment    12/6/2023  8:23 AM Julia Fuentes Add [K04.7] Dental abscess ED Disposition       ED Disposition   Transfer to Another Facility-In Network    Condition   --    Date/Time   Wed Dec 6, 2023  8:23 AM    Comment   Gina Johnson should be transferred out to [facility] . Follow-up Information       Follow up With Specialties Details Why Contact Info Additional [address] [facility] Follow up in 1 week(s) Please call to schedule a follow up appointment within one week of your ER visit.  [facility], [address] [place] [address], [address] [place] [address], [address] [place] [address]          Discharge Medication List as of 12/6/2023  3:05 PM        CONTINUE these medications which have NOT CHANGED    Details   penicillin V potassium (VEETID) 500 mg tablet Take 1 tablet (500 mg total) by mouth 4 (four) times a day for 7 days, Starting Mon 12/4/2023, Until Mon 12/11/2023, Normal                  ED Provider  Electronically Signed by     Shona Gilbert PA-C  12/06/23 1547

## 2023-12-06 NOTE — CASE MANAGEMENT
Case Management ED Discharge Planning Note    Patient name Fox Horan  Location ED-10/ED-10 MRN [id number]  : 1985 Date 2023        OBJECTIVE:  Predictive Model Details         12% Factor Value    Risk of Hospital Admission or ED Visit Model Is in Relationship Yes     Number of ED Visits 3     Has Medicaid Yes            Chief Complaint: Pain, dental .  Patient Class: Emergency  Preferred Pharmacy:   [address] [place] [address],[place] [address]  Phone: [phone] Fax: [phone]    Primary Care Provider: No primary care provider on file. Primary Insurance: [address] [place] [address]  Secondary Insurance:     ED Discharge Details: Additional Comments: CM engaged pt at bedside to inquire if they have a PCP as there is none listed in the chart. Pt does not have pcp and is requesting one. CM added karen Jon to AVS. Pt advised to schedule apt within 1 week of his visit.     Follow-up Appointment Information  Follow up appointment scheduled with[de-identified] [facility]  Follow up appointments scheduled for the following [facility][de-identified] PCP

## 2023-12-06 NOTE — CONSULTS
Oral and Maxillofacial Surgery Consult    Pt seen 12/06/23 3PM     Assessment: 41 y.o. male evaluated for LR dental pain and swelling. Pt was seen at a hospital yesterday for dental pain x3 days, but was concerned that the swelling and pain increased despite being d/c on PCN, percocet, naproxen. Pt presented back to the ED again today with worsening swelling and now reporting difficulty swallowing w/ fevers and chills, but denies airway issues. Bedside clinical and radiographic exam reveal carious #17,19,32, severe periodontal disease of #12,13,25 and R submandibular abscess. Tooth #15 also w/ severe periodontal bone loss. Procedure outline, indications, and risks/benefits/alternatives discussed at length with patient and his family. Risks discussed include but are not limited to pain, bleeding, swelling, infection, need for further surgery, decreased or altered sensation of lips/cheeks/teeth/gums/tongue, need for additional surgery. Pt was offered option of RCT of #19 w/ outside GD, but requesting extraction of #19. All patient questions answered. Informed verbal and written consent obtained and signed. Plan:  - Plan for add on OR tomorrow afternoon/evening due to OR availability for extraction of necessary teeth (#s 12,13,17,19,25,32) and I&D via IO/EO approaches  - Analgesia as per primary  - Abx: Unasyn 3g q6h  - NPO/IVF prior to OR, soft mechanical diet for now  - Encourage good oral hygiene  - HOB  - Yankauer suction at bedside    D/w OMFS attg on call    Inpatient consult to Oral and Maxillofacial Surgery  Consult performed by: Kathrin Herring DDS  Consult ordered by: Parvez Mcclure MD         HPI: 41 y.o. male evaluated for LR dental pain and swelling. Pt was seen at a hospital yesterday for dental pain x3 days, but was concerned that the swelling and pain increased despite being d/c on PCN, percocet, naproxen.  Pt presented back to the ED again today with worsening swelling and now reporting difficulty swallowing w/ fevers and chills, but denies airway issues. PMH:   Past Medical History:   Diagnosis Date    Asthma         Allergies:   No Known Allergies    Meds:     Current Facility-Administered Medications:     ketorolac (TORADOL) injection 15 mg, 15 mg, Intravenous, Q6H PRN **OR** HYDROmorphone (DILAUDID) injection 0.5 mg, 0.5 mg, Intravenous, Q6H PRN **OR** acetaminophen (TYLENOL) tablet 975 mg, 975 mg, Oral, Q8H PRN, Amadeo Hastings MD    ampicillin-sulbactam (UNASYN) 3 g in sodium chloride 0.9 % 100 mL IVPB, 3 g, Intravenous, Q6H, Servando Oscar MD    multi-electrolyte (PLASMALYTE-A/ISOLYTE-S PH 7.4) IV solution, 100 mL/hr, Intravenous, Continuous, Servando Oscar MD, Last Rate: 100 mL/hr at 12/06/23 1616, 100 mL/hr at 12/06/23 1616    ondansetron (ZOFRAN) injection 4 mg, 4 mg, Intravenous, Q6H PRN, Servando Oscar MD    PSH:   No past surgical history on file. No family history on file. Review of Systems   Constitutional:  Positive for chills and fever. HENT:  Positive for dental problem, facial swelling and trouble swallowing. Respiratory: Negative. Cardiovascular: Negative. Psychiatric/Behavioral: Negative. Temp:  [98 °F (36.7 °C)-101 °F (38.3 °C)] 101 °F (38.3 °C)  HR:  [] 103  Resp:  [18] 18  BP: (120-147)/(67-86) 126/86  SpO2:  [97 %-100 %] 98 %     No intake or output data in the 24 hours ending 12/06/23 1817     Physical Exam:  GE: AAOx3. NAD. HEENT:    Head: Mild LR facial swelling w/ TTP. LR LAD. Inferior border of the mandible not palpable in LR posterior. Mouth: ~25mm YOSELIN 2/2 guarding. Caries of #17,19,32. #73,10,59 mobile. #15 not mobile. LR vestibular swelling and LR FOM slightly raised, firm and TTP. Uvula midline. Resp: no respiratory distress on RA    Lab Results: I have personally reviewed pertinent lab results. Imaging: I have personally reviewed pertinent reports. EKG, Pathology, and Other Studies: I have personally reviewed pertinent reports. Counseling / Coordination of Care  Total floor / unit time spent today 30 minutes. Greater than 50% of total time was spent with the patient and / or family counseling and / or coordination of care. A description of the counseling / coordination of care: consult, clinical/rad exam, consent, discussion w/ pt and family. Please call or page OMFS resident on call after hours.

## 2023-12-06 NOTE — ASSESSMENT & PLAN NOTE
40 y/o M presented to ED with R dental pain, increased swelling of R side of face and difficulty swallowing. 12/6 CT Soft tissue neck: Significant periodontal disease and caries, greatest involving the left maxilla. Small fluid collection most compatible with abscess adjacent to right mandibular periodontal disease. 12/7 CT Chest: No acute intrathoracic pathology. Patient is POD 1 for I&D of abscess with OMFS. Patient feels fine, had a bowel movement, denies cough, chest pain, nausea, vomiting, fever, chills. Plan:   Discharge with Augmentin 875-125 mg twice daily.   Patient was counseled to discard previously prescribed penicillin  Follow-up with OMFS outpatient

## 2023-12-06 NOTE — EMTALA/ACUTE CARE TRANSFER
[address] [place] [id number]  Dept: [phone]      EMTALA TRANSFER CONSENT    NAME Karen Whipple December 1985                              MRN [id number]    I have been informed of my rights regarding examination, treatment, and transfer   by Dr. Mendoza Quintero, DO    Benefits:    See oral surgery  Risks:  worse in route       Consent for Transfer:  I acknowledge that my medical condition has been evaluated and explained to me by the emergency department physician or other qualified medical person and/or my attending physician, who has recommended that I be transferred to the service of    at  . The above potential benefits of such transfer, the potential risks associated with such transfer, and the probable risks of not being transferred have been explained to me, and I fully understand them. The doctor has explained that, in my case, the benefits of transfer outweigh the risks. I agree to be transferred. I authorize the performance of emergency medical procedures and treatments upon me in both transit and upon arrival at the receiving facility. Additionally, I authorize the release of any and all medical records to the receiving facility and request they be transported with me, if possible. I understand that the safest mode of transportation during a medical emergency is an ambulance and that the Hospital advocates the use of this mode of transport. Risks of traveling to the receiving facility by car, including absence of medical control, life sustaining equipment, such as oxygen, and medical personnel has been explained to me and I fully understand them. (SANDRA CORRECT BOX BELOW)  [  ]  I consent to the stated transfer and to be transported by ambulance/helicopter.   [  ]  I consent to the stated transfer, but refuse transportation by ambulance and accept full responsibility for my transportation by car. I understand the risks of non-ambulance transfers and I exonerate the Hospital and its staff from any deterioration in my condition that results from this refusal.    X___________________________________________    DATE  12/06/23  TIME________  Signature of patient or legally responsible individual signing on patient behalf           RELATIONSHIP TO PATIENT_________________________          Provider Certification    NAME Katty Gabriel 1985                              MRN [id number]    A medical screening exam was performed on the above named patient. Based on the examination:    Condition Necessitating Transfer The encounter diagnosis was Dental abscess. Patient Condition:      Reason for Transfer:      Transfer Requirements: [address] available and qualified personnel available for treatment as acknowledged by    Agreed to accept transfer and to provide appropriate medical treatment as acknowledged by          Appropriate medical records of the examination and treatment of the patient are provided at the time of transfer   [address] _______  Transfer will be performed by qualified personnel from    and appropriate transfer equipment as required, including the use of necessary and appropriate life support measures.     Provider Certification: I have examined the patient and explained the following risks and benefits of being transferred/refusing transfer to the patient/family:         Based on these reasonable risks and benefits to the patient and/or the unborn child(brenda), and based upon the information available at the time of the patient’s examination, I certify that the medical benefits reasonably to be expected from the provision of appropriate medical treatments at another medical facility outweigh the increasing risks, if any, to the individual’s medical condition, and in the case of labor to the unborn child, from effecting the transfer.     X____________________________________________ DATE 12/06/23        TIME_______      ORIGINAL - SEND TO MEDICAL RECORDS   COPY - SEND WITH PATIENT DURING TRANSFER

## 2023-12-06 NOTE — PROGRESS NOTES
[facility] Admission Note   Unit/Bed 765-01 Encounter: [id number]  SOD Team A    Talib Miguel Flaming 41 y.o. male [id number]       Patient seen and examined. Reviewed H&P per medical student Shadi Forbes. Agree with the assessment and plan except as mentioned below. Patient is a 61-year-old male with history of asthma who presented to the ED for dental pain, facial swelling, chills and difficulty swallowing. He initially was in the ED prior to this for similar symptoms and was given a prescription for antibiotics including penicillin, Percocet and naproxen. As per the patient, his symptoms did not improve and he continued to have pain in the right side of his face and swelling and a as a result decided to come to the ED. Following hospitalization, patient was noted to have significant periodontal disease and caries involving the left maxilla and a small fluid collection that may be compatible with abscess adjacent to the right mandibular periodontal disease region. As a result, patient was transferred over to [place] [address] for OMFS Evaluation.     Assessment/Plan: Principal Problem:    Mandibular abscess     -On Unasyn 3 g Q6H  -On IVF  -NPO  -OMFS consulted  -Pain control PRN  -Zofran as needed  -Level 1 full code as discussed with patient    Disposition:  INPATIENT     Expected LOS: >2 Midnights    Dallin Krishnan MD  Internal Medicine Residency PGY-3  [facility]

## 2023-12-06 NOTE — ED PROVIDER NOTES
History  Chief Complaint   Patient presents with    Dental Pain     Right lower dental pain, patient seen recently for same, patient states pain has gotten worse, difficulty swallowing     60-year-old male with history of asthma presents to ED for evaluation of dental pain, facial swelling, chills, difficulty swallowing. Patient was seen here yesterday for dental pain without chills, facial swelling, difficulty swallowing. The latter are new since discharge. He was given prescription for penicillin, Percocet, naproxen. Last dose of these medications was 7 PM.  Patient is concerned that despite taking medications the swelling in the right side of his face and overall pain is increased. He is now describing difficulty with swallowing. Patient is concerned that he is experiencing an allergic reaction to medication versus worsening of infection. Denies previous allergic reaction to penicillins, opioids. Patient rates the pain as a 10 out of 10. Denies immunocompromising conditions. Current dental pain has been present for the past 3 days. He is intending on following up with dentist for definitive treatment. Patient endorsing chills as well. Denies shortness of breath, chest pain, abdominal pain, pain with urination, blood in urine, blood in stool, constipation, diarrhea, increased urinary frequency, ear discharge. Prior to Admission Medications   Prescriptions Last Dose Informant Patient Reported?  Taking?   ibuprofen (MOTRIN) 600 mg tablet Past Week  No Yes   Sig: Take 1 tablet (600 mg total) by mouth every 6 (six) hours as needed for moderate pain   ketorolac (TORADOL) 10 mg tablet Not Taking  No No   Sig: Take 1 tablet (10 mg total) by mouth every 6 (six) hours as needed for mild pain   Patient not taking: Reported on 5/4/2023   naproxen (NAPROSYN) 500 mg tablet Unknown  No No   Sig: Take 1 tablet (500 mg total) by mouth 2 (two) times a day with meals   oxyCODONE-acetaminophen (Percocet) 5-325 mg per tablet   No No   Sig: Take 1 tablet by mouth every 4 (four) hours as needed for moderate pain Max Daily Amount: 6 tablets   penicillin V potassium (VEETID) 500 mg tablet   No No   Sig: Take 1 tablet (500 mg total) by mouth 4 (four) times a day for 7 days      Facility-Administered Medications: None       Past Medical History:   Diagnosis Date    Asthma        No past surgical history on file. No family history on file. I have reviewed and agree with the history as documented. E-Cigarette/Vaping     E-Cigarette/Vaping Substances     Social History     Tobacco Use    Smoking status: Every Day     Packs/day: 0.25     Types: Cigarettes    Smokeless tobacco: Never   Substance Use Topics    Alcohol use: Yes     Comment: socially    Drug use: Not Currently     Types: Marijuana       Review of Systems   Constitutional:  Positive for chills. Negative for diaphoresis, fatigue and fever. HENT:  Positive for dental problem, facial swelling and trouble swallowing. Negative for ear pain, sore throat and voice change. Eyes:  Negative for pain and visual disturbance. Respiratory:  Negative for cough, shortness of breath, wheezing and stridor. Cardiovascular:  Negative for chest pain and palpitations. Gastrointestinal:  Negative for abdominal pain and vomiting. Genitourinary:  Negative for dysuria and hematuria. Musculoskeletal:  Negative for arthralgias and back pain. Skin:  Negative for color change and rash. Neurological:  Negative for seizures and syncope. All other systems reviewed and are negative. Physical Exam  Physical Exam  Vitals and nursing note reviewed. Constitutional:       General: He is not in acute distress. Appearance: He is well-developed. He is ill-appearing. He is not toxic-appearing or diaphoretic. HENT:      Head: Normocephalic and atraumatic. Comments: Right side of patient's jaw appears swollen compared to left side of jaw.   Tender to palpation of right submandibular region with swelling, fluctuance. Right Ear: Tympanic membrane, ear canal and external ear normal. There is no impacted cerumen. Left Ear: Tympanic membrane, ear canal and external ear normal. There is no impacted cerumen. Nose: Nose normal.      Mouth/Throat:      Mouth: No angioedema. Dentition: Abnormal dentition. Dental tenderness and dental caries present. Tongue: No lesions. Tongue does not deviate from midline. Palate: No mass and lesions. Pharynx: Oropharynx is clear. Uvula midline. Tonsils: No tonsillar exudate or tonsillar abscesses. Comments: Exquisitely tender of right lower wisdom tooth. Surrounding gum inflammation. No drainable dental abscesses. No angioedema. No Ludewig angina seen. Airway intact. Eyes:      General: No scleral icterus. Right eye: No discharge. Left eye: No discharge. Extraocular Movements: Extraocular movements intact. Conjunctiva/sclera: Conjunctivae normal.      Pupils: Pupils are equal, round, and reactive to light. Cardiovascular:      Rate and Rhythm: Normal rate and regular rhythm. Heart sounds: No murmur heard. No friction rub. No gallop. Pulmonary:      Effort: Pulmonary effort is normal. No respiratory distress. Breath sounds: Normal breath sounds. No wheezing, rhonchi or rales. Abdominal:      Palpations: Abdomen is soft. Tenderness: There is no abdominal tenderness. Musculoskeletal:         General: No swelling. Cervical back: Neck supple. Skin:     General: Skin is warm and dry. Capillary Refill: Capillary refill takes less than 2 seconds. Neurological:      Mental Status: He is alert.    Psychiatric:         Mood and Affect: Mood normal.         Vital Signs  ED Triage Vitals [12/06/23 0225]   Temperature Pulse Respirations Blood Pressure SpO2   98 °F (36.7 °C) (!) 107 18 147/77 99 %      Temp Source Heart Rate Source Patient Position - Orthostatic VS BP Location FiO2 (%)   Oral Monitor Sitting Right arm --      Pain Score       10 - Worst Possible Pain           Vitals:    12/06/23 0225 12/06/23 0318 12/06/23 0445 12/06/23 0626   BP: 147/77 141/85 133/77 128/69   Pulse: (!) 107 98 103 92   Patient Position - Orthostatic VS: Sitting Lying Lying Lying         Visual Acuity      ED Medications  Medications   ampicillin-sulbactam (UNASYN) 3 g in sodium chloride 0.9 % 100 mL IVPB (has no administration in time range)   ropivacaine (NAROPIN) 0.5 % injection 5 mL (has no administration in time range)   ketorolac (TORADOL) injection 15 mg (15 mg Intravenous Given 12/6/23 0321)   HYDROmorphone (DILAUDID) injection 1 mg (1 mg Intravenous Given 12/6/23 0319)   iohexol (OMNIPAQUE) 350 MG/ML injection (MULTI-DOSE) 85 mL (85 mL Intravenous Given 12/6/23 0510)       Diagnostic Studies  Results Reviewed       Procedure Component Value Units Date/Time    Basic metabolic panel [[id number]]  (Abnormal) Collected: 12/06/23 0322    Lab Status: Final result Specimen: Blood from Arm, Right Updated: 12/06/23 0349     Sodium 133 mmol/L      Potassium 3.9 mmol/L      Chloride 102 mmol/L      CO2 26 mmol/L      ANION GAP 5 mmol/L      BUN 10 mg/dL      Creatinine 0.93 mg/dL      Glucose 114 mg/dL      Calcium 9.3 mg/dL      eGFR 105 ml/min/1.73sq m     Narrative:      [place] guidelines for Chronic Kidney Disease (CKD):     Stage 1 with normal or high GFR (GFR > 90 mL/min/1.73 square meters)    Stage 2 Mild CKD (GFR = 60-89 mL/min/1.73 square meters)    Stage 3A Moderate CKD (GFR = 45-59 mL/min/1.73 square meters)    Stage 3B Moderate CKD (GFR = 30-44 mL/min/1.73 square meters)    Stage 4 Severe CKD (GFR = 15-29 mL/min/1.73 square meters)    Stage 5 End Stage CKD (GFR <15 mL/min/1.73 square meters)  Note: GFR calculation is accurate only with a steady state creatinine    CBC and differential [[id number]]  (Abnormal) Collected: 12/06/23 0322    Lab Status: Final result Specimen: Blood from Arm, Right Updated: 12/06/23 0333     WBC 11.80 Thousand/uL      RBC 4.65 Million/uL      Hemoglobin 14.0 g/dL      Hematocrit 41.0 %      MCV 88 fL      MCH 30.1 pg      MCHC 34.1 g/dL      RDW 14.3 %      MPV 10.4 fL      Platelets 117 Thousands/uL      nRBC 0 /100 WBCs      Neutrophils Relative 87 %      Immat GRANS % 0 %      Lymphocytes Relative 6 %      Monocytes Relative 7 %      Eosinophils Relative 0 %      Basophils Relative 0 %      Neutrophils Absolute 10.12 Thousands/µL      Immature Grans Absolute 0.03 Thousand/uL      Lymphocytes Absolute 0.75 Thousands/µL      Monocytes Absolute 0.85 Thousand/µL      Eosinophils Absolute 0.02 Thousand/µL      Basophils Absolute 0.03 Thousands/µL                    CT soft tissue neck   Final Result by Carla Huntley MD (12/06 9885)      Significant periodontal disease and caries, greatest involving the left maxilla. Small fluid collection most compatible with abscess adjacent to right mandibular periodontal disease. Other nonemergent findings above. Workstation performed: [id number]                    Procedures  Procedures         ED Course                               SBIRT 22yo+      Flowsheet Row Most Recent Value   Initial Alcohol Screen: US AUDIT-C     1. How often do you have a drink containing alcohol? 0 Filed at: 12/06/2023 0232   2. How many drinks containing alcohol do you have on a typical day you are drinking? 0 Filed at: 12/06/2023 0232   3a. Male UNDER 65: How often do you have five or more drinks on one occasion? 0 Filed at: 12/06/2023 0232   Audit-C Score 0 Filed at: 12/06/2023 7309   LEAH: How many times in the past year have you. .. Used an illegal drug or used a prescription medication for non-medical reasons?  Never Filed at: 12/06/2023 0232                      Medical Decision Making  27-year-old male presents to ED for evaluation of dental pain, chills, difficulty swallowing, facial swelling. Was seen previously in the ED for dental pain. He has been following treatment recommendations made in the ED. Despite treatment, patient's symptoms are worsening. On physical examination he is tachycardic with a heart rate of 107 bpm.  Normotensive, afebrile, without respiratory distress. Airway intact. No angioedema. No Ludewig angina. Right side of face does appear swollen. Right inferior wisdom tooth with surrounding inflammation and erythema of gums. No drainable dental abscess. No abnormal lung sounds. No murmur. Given patient's worsening of symptoms, apparent swelling, describes difficulty swallowing: Will obtain CT soft tissue neck with IV contrast.  Also obtaining CBC, BMP. Providing patient with symptomatic relief with IV Dilaudid, Toradol. Differential diagnosis includes but not limited to dental caries, dental abscess, cellulitis, peritonsillar abscess, retropharyngeal abscess, early Ludewig angina    Patient reports that pain is improved after Dilaudid, Toradol. CBC returned with a WBC of 11.8. BMP returned without concerning values. Sodium 133 which is slightly lower than reference range. CT soft tissue neck with IV contrast returned with findings concerning for abscess formation. Will discuss with OMS for further treatment planning. Providing patient with dose of Unasyn. Patient signed out to Taya Quick PA-C. Amount and/or Complexity of Data Reviewed  Labs: ordered. Radiology: ordered. Risk  Prescription drug management. Disposition  Final diagnoses:   None     ED Disposition       None          Follow-up Information    None         Patient's Medications   Discharge Prescriptions    No medications on file       No discharge procedures on file.     PDMP Review         Value Time User    PDMP Reviewed  Yes 5/4/2023  2:13 PM Keshawn Salazar DO            ED Provider  Electronically Signed by             Zoya Carrasquillo PA-C  12/06/23 3967

## 2023-12-07 ENCOUNTER — APPOINTMENT (INPATIENT)
Dept: RADIOLOGY | Facility: HOSPITAL | Age: 38
DRG: 115 | End: 2023-12-07
Payer: COMMERCIAL

## 2023-12-07 LAB
ANION GAP SERPL CALCULATED.3IONS-SCNC: 9 MMOL/L
BASOPHILS # BLD MANUAL: 0 THOUSAND/UL (ref 0–0.1)
BASOPHILS NFR MAR MANUAL: 0 % (ref 0–1)
BUN SERPL-MCNC: 10 MG/DL (ref 5–25)
CALCIUM SERPL-MCNC: 8.4 MG/DL (ref 8.4–10.2)
CHLORIDE SERPL-SCNC: 103 MMOL/L (ref 96–108)
CO2 SERPL-SCNC: 25 MMOL/L (ref 21–32)
CREAT SERPL-MCNC: 0.79 MG/DL (ref 0.6–1.3)
EOSINOPHIL # BLD MANUAL: 0 THOUSAND/UL (ref 0–0.4)
EOSINOPHIL NFR BLD MANUAL: 0 % (ref 0–6)
ERYTHROCYTE [DISTWIDTH] IN BLOOD BY AUTOMATED COUNT: 14.6 % (ref 11.6–15.1)
GFR SERPL CREATININE-BSD FRML MDRD: 115 ML/MIN/1.73SQ M
GLUCOSE SERPL-MCNC: 94 MG/DL (ref 65–140)
HCT VFR BLD AUTO: 38.1 % (ref 36.5–49.3)
HGB BLD-MCNC: 13.1 G/DL (ref 12–17)
LYMPHOCYTES # BLD AUTO: 14 % (ref 14–44)
LYMPHOCYTES # BLD AUTO: 2.11 THOUSAND/UL (ref 0.6–4.47)
MCH RBC QN AUTO: 30.7 PG (ref 26.8–34.3)
MCHC RBC AUTO-ENTMCNC: 34.4 G/DL (ref 31.4–37.4)
MCV RBC AUTO: 89 FL (ref 82–98)
MONOCYTES # BLD AUTO: 0.5 THOUSAND/UL (ref 0–1.22)
MONOCYTES NFR BLD: 4 % (ref 4–12)
NEUTROPHILS # BLD MANUAL: 9.82 THOUSAND/UL (ref 1.85–7.62)
NEUTS BAND NFR BLD MANUAL: 6 % (ref 0–8)
NEUTS SEG NFR BLD AUTO: 73 % (ref 43–75)
PLATELET # BLD AUTO: 200 THOUSANDS/UL (ref 149–390)
PLATELET BLD QL SMEAR: ADEQUATE
PMV BLD AUTO: 11.5 FL (ref 8.9–12.7)
POTASSIUM SERPL-SCNC: 3.9 MMOL/L (ref 3.5–5.3)
RBC # BLD AUTO: 4.27 MILLION/UL (ref 3.88–5.62)
SODIUM SERPL-SCNC: 137 MMOL/L (ref 135–147)
VARIANT LYMPHS # BLD AUTO: 3 %
WBC # BLD AUTO: 12.43 THOUSAND/UL (ref 4.31–10.16)

## 2023-12-07 PROCEDURE — 99223 1ST HOSP IP/OBS HIGH 75: CPT | Performed by: HOSPITALIST

## 2023-12-07 PROCEDURE — 85027 COMPLETE CBC AUTOMATED: CPT | Performed by: STUDENT IN AN ORGANIZED HEALTH CARE EDUCATION/TRAINING PROGRAM

## 2023-12-07 PROCEDURE — NC001 PR NO CHARGE: Performed by: HOSPITALIST

## 2023-12-07 PROCEDURE — 80048 BASIC METABOLIC PNL TOTAL CA: CPT | Performed by: STUDENT IN AN ORGANIZED HEALTH CARE EDUCATION/TRAINING PROGRAM

## 2023-12-07 PROCEDURE — 85007 BL SMEAR W/DIFF WBC COUNT: CPT | Performed by: STUDENT IN AN ORGANIZED HEALTH CARE EDUCATION/TRAINING PROGRAM

## 2023-12-07 PROCEDURE — 71250 CT THORAX DX C-: CPT

## 2023-12-07 PROCEDURE — G1004 CDSM NDSC: HCPCS

## 2023-12-07 RX ORDER — KETOROLAC TROMETHAMINE 30 MG/ML
30 INJECTION, SOLUTION INTRAMUSCULAR; INTRAVENOUS EVERY 6 HOURS PRN
Status: DISCONTINUED | OUTPATIENT
Start: 2023-12-07 | End: 2023-12-07

## 2023-12-07 RX ORDER — OXYCODONE HYDROCHLORIDE 5 MG/1
5 TABLET ORAL EVERY 4 HOURS PRN
Status: DISCONTINUED | OUTPATIENT
Start: 2023-12-07 | End: 2023-12-08

## 2023-12-07 RX ORDER — SODIUM CHLORIDE, SODIUM GLUCONATE, SODIUM ACETATE, POTASSIUM CHLORIDE, MAGNESIUM CHLORIDE, SODIUM PHOSPHATE, DIBASIC, AND POTASSIUM PHOSPHATE .53; .5; .37; .037; .03; .012; .00082 G/100ML; G/100ML; G/100ML; G/100ML; G/100ML; G/100ML; G/100ML
100 INJECTION, SOLUTION INTRAVENOUS CONTINUOUS
Status: DISPENSED | OUTPATIENT
Start: 2023-12-08 | End: 2023-12-08

## 2023-12-07 RX ORDER — SODIUM CHLORIDE 9 MG/ML
100 INJECTION, SOLUTION INTRAVENOUS CONTINUOUS
Status: DISCONTINUED | OUTPATIENT
Start: 2023-12-08 | End: 2023-12-07

## 2023-12-07 RX ORDER — HYDROMORPHONE HCL/PF 1 MG/ML
0.5 SYRINGE (ML) INJECTION EVERY 4 HOURS PRN
Status: DISCONTINUED | OUTPATIENT
Start: 2023-12-07 | End: 2023-12-10 | Stop reason: HOSPADM

## 2023-12-07 RX ORDER — KETOROLAC TROMETHAMINE 30 MG/ML
15 INJECTION, SOLUTION INTRAMUSCULAR; INTRAVENOUS EVERY 6 HOURS PRN
Status: DISCONTINUED | OUTPATIENT
Start: 2023-12-07 | End: 2023-12-07

## 2023-12-07 RX ORDER — ACETAMINOPHEN 325 MG/1
975 TABLET ORAL EVERY 8 HOURS PRN
Status: DISCONTINUED | OUTPATIENT
Start: 2023-12-07 | End: 2023-12-07

## 2023-12-07 RX ORDER — ACETAMINOPHEN 160 MG/5ML
650 SUSPENSION ORAL EVERY 4 HOURS PRN
Status: DISCONTINUED | OUTPATIENT
Start: 2023-12-07 | End: 2023-12-07

## 2023-12-07 RX ORDER — ACETAMINOPHEN 325 MG/1
650 TABLET ORAL EVERY 6 HOURS PRN
Status: DISCONTINUED | OUTPATIENT
Start: 2023-12-07 | End: 2023-12-10 | Stop reason: HOSPADM

## 2023-12-07 RX ADMIN — HYDROMORPHONE HYDROCHLORIDE 0.5 MG: 1 INJECTION, SOLUTION INTRAMUSCULAR; INTRAVENOUS; SUBCUTANEOUS at 16:24

## 2023-12-07 RX ADMIN — SODIUM CHLORIDE, SODIUM GLUCONATE, SODIUM ACETATE, POTASSIUM CHLORIDE, MAGNESIUM CHLORIDE, SODIUM PHOSPHATE, DIBASIC, AND POTASSIUM PHOSPHATE 100 ML/HR: .53; .5; .37; .037; .03; .012; .00082 INJECTION, SOLUTION INTRAVENOUS at 05:01

## 2023-12-07 RX ADMIN — SODIUM CHLORIDE 3 G: 9 INJECTION, SOLUTION INTRAVENOUS at 14:50

## 2023-12-07 RX ADMIN — ACETAMINOPHEN 650 MG: 650 SUSPENSION ORAL at 09:02

## 2023-12-07 RX ADMIN — KETOROLAC TROMETHAMINE 15 MG: 30 INJECTION, SOLUTION INTRAMUSCULAR at 04:52

## 2023-12-07 RX ADMIN — KETOROLAC TROMETHAMINE 15 MG: 30 INJECTION, SOLUTION INTRAMUSCULAR at 11:49

## 2023-12-07 RX ADMIN — SODIUM CHLORIDE 3 G: 9 INJECTION, SOLUTION INTRAVENOUS at 20:26

## 2023-12-07 RX ADMIN — SODIUM CHLORIDE 3 G: 9 INJECTION, SOLUTION INTRAVENOUS at 09:08

## 2023-12-07 RX ADMIN — KETOROLAC TROMETHAMINE 30 MG: 30 INJECTION, SOLUTION INTRAMUSCULAR; INTRAVENOUS at 22:15

## 2023-12-07 RX ADMIN — SODIUM CHLORIDE 3 G: 9 INJECTION, SOLUTION INTRAVENOUS at 01:59

## 2023-12-07 NOTE — UTILIZATION REVIEW
NOTIFICATION OF INPATIENT ADMISSION   AUTHORIZATION REQUEST   SERVICING FACILITY:   [facility]  Address: [address] [place] [address], [address] [place] [address] Place [id number]  Tax ID: [id number]  NPI: [id number] ATTENDING PROVIDER:  Attending Name and NPI#: Brandon Grubbs Md [[id number]]  Address: [address] [place] [address]  Phone: [phone]   ADMISSION INFORMATION:  Place of Service: [address] [place] [address]  Place of Service Code: 21  Inpatient Admission Date/Time: 12/6/23  3:48 PM  Discharge Date/Time: No discharge date for patient encounter. Admitting Diagnosis Code/Description:  Dental abscess [K04.7]     UTILIZATION REVIEW CONTACT:  Skip Coleman Utilization   Network Utilization Review Department  Phone: [phone]  Fax: [phone]  Email: [email or web address]  Contact for approvals/pending authorizations, clinical reviews, and discharge. PHYSICIAN ADVISORY SERVICES:  Medical Necessity Denial & Ffle-yv-Qctn Review  Phone: [phone]  Fax: [phone]  Email: [email or web address]     DISCHARGE SUPPORT TEAM:  For Patients Discharge Needs & Updates  Phone: [phone] opt. 2 Fax: [phone]  Email: [email or web address]

## 2023-12-07 NOTE — PLAN OF CARE
Problem: PAIN - ADULT  Goal: Verbalizes/displays adequate comfort level or baseline comfort level  Description: Interventions:  - Encourage patient to monitor pain and request assistance  - Assess pain using appropriate pain scale  - Administer analgesics based on type and severity of pain and evaluate response  - Implement non-pharmacological measures as appropriate and evaluate response  - Consider cultural and social influences on pain and pain management  - Notify physician/advanced practitioner if interventions unsuccessful or patient reports new pain  Outcome: Progressing     Problem: INFECTION - ADULT  Goal: Absence or prevention of progression during hospitalization  Description: INTERVENTIONS:  - Assess and monitor for signs and symptoms of infection  - Monitor lab/diagnostic results  - Monitor all insertion sites, i.e. indwelling lines, tubes, and drains  - Monitor endotracheal if appropriate and nasal secretions for changes in amount and color  - [place] appropriate cooling/warming therapies per order  - Administer medications as ordered  - Instruct and encourage patient and family to use good hand hygiene technique  - Identify and instruct in appropriate isolation precautions for identified infection/condition  Outcome: Progressing  Goal: Absence of fever/infection during neutropenic period  Description: INTERVENTIONS:  - Monitor WBC    Outcome: Progressing     Problem: SAFETY ADULT  Goal: Patient will remain free of falls  Description: INTERVENTIONS:  - Educate patient/family on patient safety including physical limitations  - Instruct patient to call for assistance with activity   - Consult OT/PT to assist with strengthening/mobility   - Keep Call bell within reach  - Keep bed low and locked with side rails adjusted as appropriate  - Keep care items and personal belongings within reach  - Initiate and maintain comfort rounds  - Make Fall Risk Sign visible to staff  - Offer Toileting every 2 Hours, in advance of need  - Initiate/Maintain bed alarm  - Obtain necessary fall risk management equipment:   - Apply yellow socks and bracelet for high fall risk patients  - Consider moving patient to room near nurses station  Outcome: Progressing  Goal: Maintain or return to baseline ADL function  Description: INTERVENTIONS:  -  Assess patient's ability to carry out ADLs; assess patient's baseline for ADL function and identify physical deficits which impact ability to perform ADLs (bathing, care of mouth/teeth, toileting, grooming, dressing, etc.)  - Assess/evaluate cause of self-care deficits   - Assess range of motion  - Assess patient's mobility; develop plan if impaired  - Assess patient's need for assistive devices and provide as appropriate  - Encourage maximum independence but intervene and supervise when necessary  - Involve family in performance of ADLs  - Assess for home care needs following discharge   - Consider OT consult to assist with ADL evaluation and planning for discharge  - Provide patient education as appropriate  Outcome: Progressing  Goal: Maintains/Returns to pre admission functional level  Description: INTERVENTIONS:  - Perform AM-PAC 6 Click Basic Mobility/ Daily Activity assessment daily.  - Set and communicate daily mobility goal to care team and patient/family/caregiver. - Collaborate with rehabilitation services on mobility goals if consulted  - Perform Range of Motion 3 times a day. - Reposition patient every 2 hours.   - Dangle patient 3 times a day  - Stand patient 3 times a day  - Ambulate patient 3 times a day  - Out of bed to chair 3 times a day   - Out of bed for meals 3 times a day  - Out of bed for toileting  - Record patient progress and toleration of activity level   Outcome: Progressing     Problem: DISCHARGE PLANNING  Goal: Discharge to home or other facility with appropriate resources  Description: INTERVENTIONS:  - Identify barriers to discharge w/patient and caregiver  - Arrange for needed discharge resources and transportation as appropriate  - Identify discharge learning needs (meds, wound care, etc.)  - Arrange for interpretive services to assist at discharge as needed  - Refer to Case Management Department for coordinating discharge planning if the patient needs post-hospital services based on physician/advanced practitioner order or complex needs related to functional status, cognitive ability, or social support system  Outcome: Progressing     Problem: Knowledge Deficit  Goal: Patient/family/caregiver demonstrates understanding of disease process, treatment plan, medications, and discharge instructions  Description: Complete learning assessment and assess knowledge base.   Interventions:  - Provide teaching at level of understanding  - Provide teaching via preferred learning methods  Outcome: Progressing

## 2023-12-07 NOTE — UTILIZATION REVIEW
Initial Clinical Review    Admission: Date/Time/Statement:   Admission Orders (From admission, onward)       Ordered        12/06/23 1601  Inpatient Admission  Once                          Orders Placed This Encounter   Procedures    Inpatient Admission     Standing Status:   Standing     Number of Occurrences:   1     Order Specific Question:   Level of Care     Answer:   Med Surg [16]     Order Specific Question:   Estimated length of stay     Answer:   More than 2 Midnights     Order Specific Question:   Certification     Answer:   I certify that inpatient services are medically necessary for this patient for a duration of greater than two midnights. See H&P and MD Progress Notes for additional information about the patient's course of treatment. ED Arrival Information       Patient not seen in ED                         Initial Presentation: 41 y.o. male, Transfer from [facility] ED presents for dental pain x3 days, facial swelling, chills and difficult swallowing. Pt was seen the day before for similar symptoms and was prescribed penicillin, percocet, naproxen but his symptoms persisted. Dental pain is 6/10, sharp-type, on R side of face. In ED, pt noted with significant periodontal disease and caries, greatest involving the left maxilla. A Small fluid collection most compatible with abscess adjacent to right mandibular periodontal disease. Transferred for OMFS evaluation. Pain worse with swallowing. Admit Inpatient level of care for Mandibular abscess. NPO. Iv antibiotics and IVFs. OMFS consult. Zofran prn for nausea. Pain control. On exam; R facial swelling + ; tender    12/6  OMFS cons; Bedside clinical and radiographic exam reveal carious #17,19,32, severe periodontal disease of #12,13,25 and R submandibular abscess. Tooth #15 also w/ severe periodontal bone loss.    Plan for add on OR tomorrow afternoon/evening due to OR availability for extraction of necessary teeth (#s 12,13,17,19,25,32) and I&D via IO/EO approaches. Continue Iv antibiotics and IVFS. NPO. HOB. Champ suction at bedside. Pain control. Date: 12/7   Day 2:   Progress notes; Plan for OR today. Continue Iv antibiotics and IVFs. On exam;  he is tender and swollen under mandible and anterior neck. Reports a headache that has been bothering him since yesterday. He states that tylenol does not resolve the pain. He also states that he feels his anterior neck is becoming more swollen and tender. He states that he feels warm but has remained afebrile since yesterday when he had one reading of a Tmax of 101 F.     12/8  Plan for OR tomorrow 12/9. On clear liquid diet. SLP eval ordered. 12/9 Tentative OR -  INCISION AND DRAINAGE (I&D) HEAD/FACE (Right: Face)       EXTRACTION TEETH MULTIPLE (Bilateral: Mouth)     Vitals   Temperature Pulse Respirations Blood Pressure SpO2   12/06/23 1545 12/06/23 1545 12/07/23 0816 12/06/23 1545 12/06/23 1545   (!) 101 °F (38.3 °C) 103 18 126/86 98 %      Temp src Heart Rate Source Patient Position - Orthostatic VS BP Location FiO2 (%)   -- -- -- -- --             Pain Score       12/06/23 1738       5          Wt Readings from Last 1 Encounters:   12/06/23 70.1 kg (154 lb 8.7 oz)     Additional Vital Signs:   12/07/23 08:16:44 98.5 °F (36.9 °C) 88 18 121/78 92 98 %   12/06/23 23:07:44 99.2 °F (37.3 °C) 86 -- 107/62 77 99 %     Pertinent Labs/Diagnostic Test Results:   12/6  CT soft tissue neck w contrast - Significant periodontal disease and caries, greatest involving the left maxilla. Small fluid collection most compatible with abscess adjacent to right mandibular periodontal disease.       Results from last 7 days   Lab Units 12/07/23  0442 12/06/23  0322   WBC Thousand/uL 12.43* 11.80*   HEMOGLOBIN g/dL 13.1 14.0   HEMATOCRIT % 38.1 41.0   PLATELETS Thousands/uL 200 196   NEUTROS ABS Thousands/µL  --  10.12*   BANDS PCT % 6  --          Results from last 7 days   Lab Units 12/07/23  0442 12/06/23  5450 SODIUM mmol/L 137 133*   POTASSIUM mmol/L 3.9 3.9   CHLORIDE mmol/L 103 102   CO2 mmol/L 25 26   ANION GAP mmol/L 9 5   BUN mg/dL 10 10   CREATININE mg/dL 0.79 0.93   EGFR ml/min/1.73sq m 115 105   CALCIUM mg/dL 8.4 9.3             Results from last 7 days   Lab Units 12/07/23  0442 12/06/23  0322   GLUCOSE RANDOM mg/dL 94 114         Past Medical History:   Diagnosis Date    Asthma      Present on Admission:   Mandibular abscess      Admitting Diagnosis: Dental abscess [K04.7]  Age/Sex: 41 y.o. male    Admission Orders:  Scheduled Medications:  ampicillin-sulbactam, 3 g, Intravenous, Q6H      Continuous IV Infusions:  multi-electrolyte, 100 mL/hr, Intravenous, Continuous      PRN Meds:  ketorolac, 15 mg, Intravenous, Q6H PRN 12/6 x1, 12/7 x2   Or  HYDROmorphone, 0.5 mg, Intravenous, Q6H PRN  ondansetron, 4 mg, Intravenous, Q6H PRN        IP CONSULT TO ORAL AND MAXILLOFACIAL SURGERY    Network Utilization Review Department  ATTENTION: Please call with any questions or concerns to [phone] and carefully listen to the prompts so that you are directed to the right person. All voicemails are confidential.   For Discharge needs, contact Care Management DC Support Team at [phone] opt. 2  Send all requests for admission clinical reviews, approved or denied determinations and any other requests to dedicated fax number below belonging to the campus where the patient is receiving treatment.  List of dedicated fax numbers for the Facilities:  [place] DENIALS (Administrative/Medical Necessity) [phone]   DISCHARGE SUPPORT TEAM (NETWORK) [id number] [facility] (Maternity/NICU/Pediatrics) [address] [place] [address] [address] [facility] [phone] [address] [phone]   [facility] [address] [address] [facility] [address] [facility]  [address]  [phone]

## 2023-12-07 NOTE — PROGRESS NOTES
INTERNAL MEDICINE RESIDENCY PROGRESS NOTE     Name: Sy Chacon   Age & Sex: 41 y.o. male   MRN: [id number]  Unit/Bed#: -01   Encounter: [id number]  Team: SOD Team A    PATIENT INFORMATION     Name: Sy Chacon   Age & Sex: 41 y.o. male   MRN: [id number]  Hospital Stay Days: 1    ASSESSMENT/PLAN     Principal Problem:    Mandibular abscess      * Mandibular abscess  Assessment & Plan  42 y/o M presented to ED with R dental pain, increased swelling of R side of face and difficulty swallowing. CT Soft tissue neck: Significant periodontal disease and caries, greatest involving the left maxilla. Small fluid collection most compatible with abscess adjacent to right mandibular periodontal disease. Patient is scheduled tentatively today for I&D of abscess and bilateral teeth extraction. On exam, he is tender and swollen under mandible and anterior neck. Plan:   - NPO   - Continue Unasyn 3g IV q6H  - Pain management  - Zofran prn for nausea   - OMFS consult, appreciate recs  - Plan for surgery today   - Continue IVF        Disposition: continue inpatient care as patient is scheduled as an add on for oral surgery today    SUBJECTIVE     Patient seen and examined. He reports a headache that has been bothering him since yesterday. He states that tylenol does not resolve the pain. He also states that he feels his anterior neck is becoming more swollen and tender. He states that he feels warm but has remained afebrile since yesterday when he had one reading of a Tmax of 101 F. He denies any shortness of breath, nausea, vomiting, or diarrhea.      OBJECTIVE     Vitals:    23 1545 23 2307 23 0816   BP: 126/86 107/62 121/78   Pulse: 103 86 88   Resp:   18   Temp: (!) 101 °F (38.3 °C) 99.2 °F (37.3 °C) 98.5 °F (36.9 °C)   SpO2: 98% 99% 98%      Temperature:   Temp (24hrs), Av.6 °F (37.6 °C), Min:98.5 °F (36.9 °C), Max:101 °F (38.3 °C)    Temperature: 98.5 °F (36.9 °C)  Intake & Output:  I/O       None          Weights: There is no height or weight on file to calculate BMI. Weight (last 2 days)       None          Physical Exam  Constitutional:       General: He is not in acute distress. HENT:      Head: Normocephalic and atraumatic. Right Ear: External ear normal.      Left Ear: External ear normal.      Nose: Nose normal.      Mouth/Throat:      Mouth: Mucous membranes are moist.      Comments: Dental caries   Mildly swollen on R side neck  Eyes:      Conjunctiva/sclera: Conjunctivae normal.   Neck:      Comments: Tenderness elicited on palpation of neck, especially on the right  Cardiovascular:      Rate and Rhythm: Normal rate and regular rhythm. Heart sounds: Normal heart sounds. Pulmonary:      Effort: Pulmonary effort is normal.      Breath sounds: Normal breath sounds. Abdominal:      General: Bowel sounds are normal.      Palpations: Abdomen is soft. Tenderness: There is no abdominal tenderness. Musculoskeletal:         General: No swelling. Right lower leg: No edema. Left lower leg: No edema. Lymphadenopathy:      Cervical: Cervical adenopathy present. Skin:     General: Skin is warm and dry. Neurological:      General: No focal deficit present. Mental Status: He is alert and oriented to person, place, and time. Mental status is at baseline. Psychiatric:         Mood and Affect: Mood normal.         Behavior: Behavior normal.       LABORATORY DATA     Labs: I have personally reviewed pertinent reports.   Results from last 7 days   Lab Units 12/07/23  0442 12/06/23  0322   WBC Thousand/uL 12.43* 11.80*   HEMOGLOBIN g/dL 13.1 14.0   HEMATOCRIT % 38.1 41.0   PLATELETS Thousands/uL 200 196   NEUTROS PCT %  --  87*   MONOS PCT %  --  7   MONO PCT % 4  --    EOS PCT % 0 0      Results from last 7 days   Lab Units 12/07/23  0442 12/06/23  0322   POTASSIUM mmol/L 3.9 3.9   CHLORIDE mmol/L 103 102   CO2 mmol/L 25 26   BUN mg/dL 10 10 CREATININE mg/dL 0.79 0.93   CALCIUM mg/dL 8.4 9.3                            IMAGING & DIAGNOSTIC TESTING     Radiology Results: I have personally reviewed pertinent reports. CT soft tissue neck    Result Date: 12/6/2023  Impression: Significant periodontal disease and caries, greatest involving the left maxilla. Small fluid collection most compatible with abscess adjacent to right mandibular periodontal disease. Other nonemergent findings above. Workstation performed: [id number]     Other Diagnostic Testing: I have personally reviewed pertinent reports. ACTIVE MEDICATIONS     Current Facility-Administered Medications   Medication Dose Route Frequency    ampicillin-sulbactam (UNASYN) 3 g in sodium chloride 0.9 % 100 mL IVPB  3 g Intravenous Q6H    ketorolac (TORADOL) injection 15 mg  15 mg Intravenous Q6H PRN    Or    HYDROmorphone (DILAUDID) injection 0.5 mg  0.5 mg Intravenous Q6H PRN    multi-electrolyte (PLASMALYTE-A/ISOLYTE-S PH 7.4) IV solution  100 mL/hr Intravenous Continuous    ondansetron (ZOFRAN) injection 4 mg  4 mg Intravenous Q6H PRN       VTE Pharmacologic Prophylaxis: Reason for no pharmacologic prophylaxis : Patient is low risk for DVT  VTE Mechanical Prophylaxis: sequential compression device    Portions of the record may have been created with voice recognition software. Occasional wrong word or "sound a like" substitutions may have occurred due to the inherent limitations of voice recognition software. Read the chart carefully and recognize, using context, where substitutions have occurred.

## 2023-12-08 LAB
ALBUMIN SERPL BCP-MCNC: 3.4 G/DL (ref 3.5–5)
ALP SERPL-CCNC: 65 U/L (ref 34–104)
ALT SERPL W P-5'-P-CCNC: 10 U/L (ref 7–52)
ANION GAP SERPL CALCULATED.3IONS-SCNC: 8 MMOL/L
AST SERPL W P-5'-P-CCNC: 15 U/L (ref 13–39)
BILIRUB SERPL-MCNC: 0.49 MG/DL (ref 0.2–1)
BUN SERPL-MCNC: 10 MG/DL (ref 5–25)
CALCIUM ALBUM COR SERPL-MCNC: 8.9 MG/DL (ref 8.3–10.1)
CALCIUM SERPL-MCNC: 8.4 MG/DL (ref 8.4–10.2)
CHLORIDE SERPL-SCNC: 105 MMOL/L (ref 96–108)
CO2 SERPL-SCNC: 25 MMOL/L (ref 21–32)
CREAT SERPL-MCNC: 0.73 MG/DL (ref 0.6–1.3)
ERYTHROCYTE [DISTWIDTH] IN BLOOD BY AUTOMATED COUNT: 14.4 % (ref 11.6–15.1)
GFR SERPL CREATININE-BSD FRML MDRD: 119 ML/MIN/1.73SQ M
GLUCOSE SERPL-MCNC: 86 MG/DL (ref 65–140)
HCT VFR BLD AUTO: 37 % (ref 36.5–49.3)
HGB BLD-MCNC: 12.5 G/DL (ref 12–17)
MCH RBC QN AUTO: 30.2 PG (ref 26.8–34.3)
MCHC RBC AUTO-ENTMCNC: 33.8 G/DL (ref 31.4–37.4)
MCV RBC AUTO: 89 FL (ref 82–98)
PLATELET # BLD AUTO: 216 THOUSANDS/UL (ref 149–390)
PMV BLD AUTO: 11.1 FL (ref 8.9–12.7)
POTASSIUM SERPL-SCNC: 3.9 MMOL/L (ref 3.5–5.3)
PROT SERPL-MCNC: 6.2 G/DL (ref 6.4–8.4)
RBC # BLD AUTO: 4.14 MILLION/UL (ref 3.88–5.62)
SODIUM SERPL-SCNC: 138 MMOL/L (ref 135–147)
WBC # BLD AUTO: 10.67 THOUSAND/UL (ref 4.31–10.16)

## 2023-12-08 PROCEDURE — 85027 COMPLETE CBC AUTOMATED: CPT

## 2023-12-08 PROCEDURE — 80053 COMPREHEN METABOLIC PANEL: CPT

## 2023-12-08 PROCEDURE — 99233 SBSQ HOSP IP/OBS HIGH 50: CPT | Performed by: HOSPITALIST

## 2023-12-08 RX ORDER — KETOROLAC TROMETHAMINE 30 MG/ML
30 INJECTION, SOLUTION INTRAMUSCULAR; INTRAVENOUS EVERY 6 HOURS PRN
Status: DISCONTINUED | OUTPATIENT
Start: 2023-12-08 | End: 2023-12-10 | Stop reason: HOSPADM

## 2023-12-08 RX ORDER — KETOROLAC TROMETHAMINE 30 MG/ML
15 INJECTION, SOLUTION INTRAMUSCULAR; INTRAVENOUS EVERY 6 HOURS PRN
Status: DISCONTINUED | OUTPATIENT
Start: 2023-12-08 | End: 2023-12-08

## 2023-12-08 RX ADMIN — KETOROLAC TROMETHAMINE 30 MG: 30 INJECTION, SOLUTION INTRAMUSCULAR at 10:53

## 2023-12-08 RX ADMIN — KETOROLAC TROMETHAMINE 30 MG: 30 INJECTION, SOLUTION INTRAMUSCULAR at 16:33

## 2023-12-08 RX ADMIN — MUPIROCIN: 20 OINTMENT TOPICAL at 10:00

## 2023-12-08 RX ADMIN — ONDANSETRON 4 MG: 2 INJECTION INTRAMUSCULAR; INTRAVENOUS at 10:01

## 2023-12-08 RX ADMIN — MUPIROCIN: 20 OINTMENT TOPICAL at 20:33

## 2023-12-08 RX ADMIN — SODIUM CHLORIDE 3 G: 9 INJECTION, SOLUTION INTRAVENOUS at 20:33

## 2023-12-08 RX ADMIN — SODIUM CHLORIDE 3 G: 9 INJECTION, SOLUTION INTRAVENOUS at 14:26

## 2023-12-08 RX ADMIN — HYDROMORPHONE HYDROCHLORIDE 0.5 MG: 1 INJECTION, SOLUTION INTRAMUSCULAR; INTRAVENOUS; SUBCUTANEOUS at 14:26

## 2023-12-08 RX ADMIN — SODIUM CHLORIDE 3 G: 9 INJECTION, SOLUTION INTRAVENOUS at 01:50

## 2023-12-08 RX ADMIN — KETOROLAC TROMETHAMINE 30 MG: 30 INJECTION, SOLUTION INTRAMUSCULAR at 04:50

## 2023-12-08 RX ADMIN — SODIUM CHLORIDE, SODIUM GLUCONATE, SODIUM ACETATE, POTASSIUM CHLORIDE, MAGNESIUM CHLORIDE, SODIUM PHOSPHATE, DIBASIC, AND POTASSIUM PHOSPHATE 100 ML/HR: .53; .5; .37; .037; .03; .012; .00082 INJECTION, SOLUTION INTRAVENOUS at 11:22

## 2023-12-08 RX ADMIN — MUPIROCIN: 20 OINTMENT TOPICAL at 15:28

## 2023-12-08 RX ADMIN — SODIUM CHLORIDE 3 G: 9 INJECTION, SOLUTION INTRAVENOUS at 08:16

## 2023-12-08 RX ADMIN — SODIUM CHLORIDE, SODIUM GLUCONATE, SODIUM ACETATE, POTASSIUM CHLORIDE, MAGNESIUM CHLORIDE, SODIUM PHOSPHATE, DIBASIC, AND POTASSIUM PHOSPHATE 100 ML/HR: .53; .5; .37; .037; .03; .012; .00082 INJECTION, SOLUTION INTRAVENOUS at 00:14

## 2023-12-08 RX ADMIN — KETOROLAC TROMETHAMINE 30 MG: 30 INJECTION, SOLUTION INTRAMUSCULAR at 22:33

## 2023-12-08 RX ADMIN — HYDROMORPHONE HYDROCHLORIDE 0.5 MG: 1 INJECTION, SOLUTION INTRAMUSCULAR; INTRAVENOUS; SUBCUTANEOUS at 09:12

## 2023-12-08 NOTE — PLAN OF CARE
Problem: PAIN - ADULT  Goal: Verbalizes/displays adequate comfort level or baseline comfort level  Description: Interventions:  - Encourage patient to monitor pain and request assistance  - Assess pain using appropriate pain scale  - Administer analgesics based on type and severity of pain and evaluate response  - Implement non-pharmacological measures as appropriate and evaluate response  - Consider cultural and social influences on pain and pain management  - Notify physician/advanced practitioner if interventions unsuccessful or patient reports new pain  Outcome: Progressing     Problem: INFECTION - ADULT  Goal: Absence or prevention of progression during hospitalization  Description: INTERVENTIONS:  - Assess and monitor for signs and symptoms of infection  - Monitor lab/diagnostic results  - Monitor all insertion sites, i.e. indwelling lines, tubes, and drains  - Monitor endotracheal if appropriate and nasal secretions for changes in amount and color  - [place] appropriate cooling/warming therapies per order  - Administer medications as ordered  - Instruct and encourage patient and family to use good hand hygiene technique  - Identify and instruct in appropriate isolation precautions for identified infection/condition  Outcome: Progressing  Goal: Absence of fever/infection during neutropenic period  Description: INTERVENTIONS:  - Monitor WBC    Outcome: Progressing     Problem: SAFETY ADULT  Goal: Patient will remain free of falls  Description: INTERVENTIONS:  - Educate patient/family on patient safety including physical limitations  - Instruct patient to call for assistance with activity   - Consult OT/PT to assist with strengthening/mobility   - Keep Call bell within reach  - Keep bed low and locked with side rails adjusted as appropriate  - Keep care items and personal belongings within reach  - Initiate and maintain comfort rounds  - Make Fall Risk Sign visible to staff  - Offer Toileting every 2 Hours, in advance of need  - Initiate/Maintain bed alarm  - Obtain necessary fall risk management equipment:   - Apply yellow socks and bracelet for high fall risk patients  - Consider moving patient to room near nurses station  Outcome: Progressing  Goal: Maintain or return to baseline ADL function  Description: INTERVENTIONS:  -  Assess patient's ability to carry out ADLs; assess patient's baseline for ADL function and identify physical deficits which impact ability to perform ADLs (bathing, care of mouth/teeth, toileting, grooming, dressing, etc.)  - Assess/evaluate cause of self-care deficits   - Assess range of motion  - Assess patient's mobility; develop plan if impaired  - Assess patient's need for assistive devices and provide as appropriate  - Encourage maximum independence but intervene and supervise when necessary  - Involve family in performance of ADLs  - Assess for home care needs following discharge   - Consider OT consult to assist with ADL evaluation and planning for discharge  - Provide patient education as appropriate  Outcome: Progressing  Goal: Maintains/Returns to pre admission functional level  Description: INTERVENTIONS:  - Perform AM-PAC 6 Click Basic Mobility/ Daily Activity assessment daily.  - Set and communicate daily mobility goal to care team and patient/family/caregiver. - Collaborate with rehabilitation services on mobility goals if consulted  - Perform Range of Motion 3 times a day. - Reposition patient every 2 hours.   - Dangle patient 3 times a day  - Stand patient 3 times a day  - Ambulate patient 3 times a day  - Out of bed to chair 3 times a day   - Out of bed for meals 3 times a day  - Out of bed for toileting  - Record patient progress and toleration of activity level   Outcome: Progressing     Problem: DISCHARGE PLANNING  Goal: Discharge to home or other facility with appropriate resources  Description: INTERVENTIONS:  - Identify barriers to discharge w/patient and caregiver  - Arrange for needed discharge resources and transportation as appropriate  - Identify discharge learning needs (meds, wound care, etc.)  - Arrange for interpretive services to assist at discharge as needed  - Refer to Case Management Department for coordinating discharge planning if the patient needs post-hospital services based on physician/advanced practitioner order or complex needs related to functional status, cognitive ability, or social support system  Outcome: Progressing     Problem: Knowledge Deficit  Goal: Patient/family/caregiver demonstrates understanding of disease process, treatment plan, medications, and discharge instructions  Description: Complete learning assessment and assess knowledge base.   Interventions:  - Provide teaching at level of understanding  - Provide teaching via preferred learning methods  Outcome: Progressing

## 2023-12-08 NOTE — PROGRESS NOTES
INTERNAL MEDICINE RESIDENCY PROGRESS NOTE     Name: Shari Bullard   Age & Sex: 41 y.o. male   MRN: [id number]  Unit/Bed#: -[facility]   Encounter: [id number]  Team: SOD Team A    PATIENT INFORMATION     Name: Shari Bullard   Age & Sex: 41 y.o. male   MRN: [id number]  Hospital Stay Days: 2    ASSESSMENT/PLAN     Principal Problem:    Mandibular abscess      * Mandibular abscess  Assessment & Plan  42 y/o M presented to ED with R dental pain, increased swelling of R side of face and difficulty swallowing. 12/6 CT Soft tissue neck: Significant periodontal disease and caries, greatest involving the left maxilla. Small fluid collection most compatible with abscess adjacent to right mandibular periodontal disease. 12/7 CT Chest: No acute intrathoracic pathology. Patient is scheduled tentatively today for I&D of abscess and bilateral teeth extraction. Overnight, patient continues to endorse pain radiating from left SCM groove down towards left sternoclavicular angle. Patient is very tender to palpation in the area, and overnight team ordered CT neck Noncon. Plan:   Surgical intervention with OMFS tomorrow at 8 AM  Patient on clear liquid diet, will order SLP evaluation recommendations on diet appreciated  Will order topical mupirocin for left neck pain  Continue Unasyn 3g IV q6H  Pain management  Zofran prn for nausea   Continue IVF        Disposition: Discharge pending surgical evaluation with OMFS    SUBJECTIVE     Patient seen and examined. Overnight, patient continues to endorse tenderness and sensation of swelling and left SCM groove extending down towards left sternoclavicular angle. Given exquisite tenderness to palpation in the area, overnight team ordered CT chest, which did not demonstrate any intracervical or intrathoracic pathologies. Overnight, patient's Tmax was 98.5. Denied SOB, chills, abdominal pain, pain with exertion, dizziness, nausea and vomiting.      OBJECTIVE     Vitals: 23 1525 23 2340 23 0732 23 0732   BP: 119/79 125/83 133/84    Pulse: 88 85  72   Resp:   16    Temp: 99.1 °F (37.3 °C) 97.5 °F (36.4 °C) 97.9 °F (36.6 °C)    SpO2: 98% 97%  98%      Temperature:   Temp (24hrs), Av.2 °F (36.8 °C), Min:97.5 °F (36.4 °C), Max:99.1 °F (37.3 °C)    Temperature: 97.9 °F (36.6 °C)  Intake & Output:  I/O       None          Weights: There is no height or weight on file to calculate BMI. Weight (last 2 days)       None          Physical Exam  Vitals and nursing note reviewed. Constitutional:       General: He is not in acute distress. Appearance: Normal appearance. He is not ill-appearing, toxic-appearing or diaphoretic. Neck:     Cardiovascular:      Rate and Rhythm: Normal rate and regular rhythm. Pulses: Normal pulses. Heart sounds: Normal heart sounds. No murmur heard. No friction rub. No gallop. Pulmonary:      Effort: Pulmonary effort is normal. No respiratory distress. Breath sounds: Normal breath sounds. No stridor. No wheezing, rhonchi or rales. Chest:      Chest wall: No tenderness. Abdominal:      General: Abdomen is flat. Bowel sounds are normal. There is no distension. Palpations: Abdomen is soft. There is no mass. Tenderness: There is no abdominal tenderness. There is no guarding or rebound. Hernia: No hernia is present. Musculoskeletal:         General: Tenderness present. Cervical back: Normal range of motion. Rigidity and tenderness present. Lymphadenopathy:      Cervical: Cervical adenopathy present. Neurological:      Mental Status: He is alert. LABORATORY DATA     Labs: I have personally reviewed pertinent reports.   Results from last 7 days   Lab Units 23  0510 23  0442 23  0322   WBC Thousand/uL 10.67* 12.43* 11.80*   HEMOGLOBIN g/dL 12.5 13.1 14.0   HEMATOCRIT % 37.0 38.1 41.0   PLATELETS Thousands/uL 216 200 196   NEUTROS PCT %  --   --  87*   MONOS PCT %  --   --  7   MONO PCT %  --  4  --    EOS PCT %  --  0 0      Results from last 7 days   Lab Units 12/08/23  0510 12/07/23  0442 12/06/23  0322   POTASSIUM mmol/L 3.9 3.9 3.9   CHLORIDE mmol/L 105 103 102   CO2 mmol/L 25 25 26   BUN mg/dL 10 10 10   CREATININE mg/dL 0.73 0.79 0.93   CALCIUM mg/dL 8.4 8.4 9.3   ALK PHOS U/L 65  --   --    ALT U/L 10  --   --    AST U/L 15  --   --                             IMAGING & DIAGNOSTIC TESTING     Radiology Results: I have personally reviewed pertinent reports. CT chest wo contrast    Result Date: 12/8/2023  Impression: No acute intrathoracic pathology. Poorly evaluated hypodense lesion in the right hepatic lobe measuring up to approximately 3.6 cm. This should be further evaluated with contrast-enhanced CT abdomen pelvis or right upper quadrant ultrasound, which can be done on a nonemergent basis as clinically indicated. Workstation performed: [id number]     CT soft tissue neck    Result Date: 12/6/2023  Impression: Significant periodontal disease and caries, greatest involving the left maxilla. Small fluid collection most compatible with abscess adjacent to right mandibular periodontal disease. Other nonemergent findings above. Workstation performed: [id number]     Other Diagnostic Testing: I have personally reviewed pertinent reports.     ACTIVE MEDICATIONS     Current Facility-Administered Medications   Medication Dose Route Frequency    acetaminophen (TYLENOL) tablet 650 mg  650 mg Oral Q6H PRN    ampicillin-sulbactam (UNASYN) 3 g in sodium chloride 0.9 % 100 mL IVPB  3 g Intravenous Q6H    HYDROmorphone (DILAUDID) injection 0.5 mg  0.5 mg Intravenous Q4H PRN    ketorolac (TORADOL) injection 30 mg  30 mg Intravenous Q6H PRN    multi-electrolyte (PLASMALYTE-A/ISOLYTE-S PH 7.4) IV solution  100 mL/hr Intravenous Continuous    mupirocin (BACTROBAN) 2 % ointment   Topical TID    ondansetron (ZOFRAN) injection 4 mg  4 mg Intravenous Q6H PRN       VTE Pharmacologic Prophylaxis: Reason for no pharmacologic prophylaxis low risk  VTE Mechanical Prophylaxis: reason for no mechanical VTE prophylaxis low risk    Portions of the record may have been created with voice recognition software. Occasional wrong word or "sound a like" substitutions may have occurred due to the inherent limitations of voice recognition software.   Read the chart carefully and recognize, using context, where substitutions have occurred.  ==  Liudmila Nolasco  Internal Medicine Residency PGY-1

## 2023-12-09 ENCOUNTER — ANESTHESIA EVENT (INPATIENT)
Dept: PERIOP | Facility: HOSPITAL | Age: 38
DRG: 115 | End: 2023-12-09
Payer: COMMERCIAL

## 2023-12-09 ENCOUNTER — ANESTHESIA (INPATIENT)
Dept: PERIOP | Facility: HOSPITAL | Age: 38
DRG: 115 | End: 2023-12-09
Payer: COMMERCIAL

## 2023-12-09 LAB
ALBUMIN SERPL BCP-MCNC: 3.4 G/DL (ref 3.5–5)
ALP SERPL-CCNC: 67 U/L (ref 34–104)
ALT SERPL W P-5'-P-CCNC: 11 U/L (ref 7–52)
ANION GAP SERPL CALCULATED.3IONS-SCNC: 9 MMOL/L
AST SERPL W P-5'-P-CCNC: 17 U/L (ref 13–39)
BASOPHILS # BLD AUTO: 0.02 THOUSANDS/ÂΜL (ref 0–0.1)
BASOPHILS NFR BLD AUTO: 0 % (ref 0–1)
BILIRUB SERPL-MCNC: 0.41 MG/DL (ref 0.2–1)
BUN SERPL-MCNC: 10 MG/DL (ref 5–25)
CALCIUM ALBUM COR SERPL-MCNC: 9.1 MG/DL (ref 8.3–10.1)
CALCIUM SERPL-MCNC: 8.6 MG/DL (ref 8.4–10.2)
CHLORIDE SERPL-SCNC: 106 MMOL/L (ref 96–108)
CO2 SERPL-SCNC: 23 MMOL/L (ref 21–32)
CREAT SERPL-MCNC: 0.78 MG/DL (ref 0.6–1.3)
EOSINOPHIL # BLD AUTO: 0.11 THOUSAND/ÂΜL (ref 0–0.61)
EOSINOPHIL NFR BLD AUTO: 1 % (ref 0–6)
ERYTHROCYTE [DISTWIDTH] IN BLOOD BY AUTOMATED COUNT: 14.3 % (ref 11.6–15.1)
GFR SERPL CREATININE-BSD FRML MDRD: 116 ML/MIN/1.73SQ M
GLUCOSE SERPL-MCNC: 86 MG/DL (ref 65–140)
HCT VFR BLD AUTO: 34.6 % (ref 36.5–49.3)
HGB BLD-MCNC: 11.7 G/DL (ref 12–17)
IMM GRANULOCYTES # BLD AUTO: 0.04 THOUSAND/UL (ref 0–0.2)
IMM GRANULOCYTES NFR BLD AUTO: 1 % (ref 0–2)
LYMPHOCYTES # BLD AUTO: 1.57 THOUSANDS/ÂΜL (ref 0.6–4.47)
LYMPHOCYTES NFR BLD AUTO: 20 % (ref 14–44)
MCH RBC QN AUTO: 30.4 PG (ref 26.8–34.3)
MCHC RBC AUTO-ENTMCNC: 33.8 G/DL (ref 31.4–37.4)
MCV RBC AUTO: 90 FL (ref 82–98)
MONOCYTES # BLD AUTO: 0.86 THOUSAND/ÂΜL (ref 0.17–1.22)
MONOCYTES NFR BLD AUTO: 11 % (ref 4–12)
NEUTROPHILS # BLD AUTO: 5.43 THOUSANDS/ÂΜL (ref 1.85–7.62)
NEUTS SEG NFR BLD AUTO: 67 % (ref 43–75)
NRBC BLD AUTO-RTO: 0 /100 WBCS
PLATELET # BLD AUTO: 230 THOUSANDS/UL (ref 149–390)
PMV BLD AUTO: 10.7 FL (ref 8.9–12.7)
POTASSIUM SERPL-SCNC: 3.8 MMOL/L (ref 3.5–5.3)
PROT SERPL-MCNC: 6.2 G/DL (ref 6.4–8.4)
RBC # BLD AUTO: 3.85 MILLION/UL (ref 3.88–5.62)
SODIUM SERPL-SCNC: 138 MMOL/L (ref 135–147)
WBC # BLD AUTO: 8.03 THOUSAND/UL (ref 4.31–10.16)

## 2023-12-09 PROCEDURE — 99233 SBSQ HOSP IP/OBS HIGH 50: CPT | Performed by: HOSPITALIST

## 2023-12-09 PROCEDURE — 87075 CULTR BACTERIA EXCEPT BLOOD: CPT | Performed by: DENTIST

## 2023-12-09 PROCEDURE — 87186 SC STD MICRODIL/AGAR DIL: CPT | Performed by: DENTIST

## 2023-12-09 PROCEDURE — 80053 COMPREHEN METABOLIC PANEL: CPT

## 2023-12-09 PROCEDURE — 87070 CULTURE OTHR SPECIMN AEROBIC: CPT | Performed by: DENTIST

## 2023-12-09 PROCEDURE — 0CDXXZ0 EXTRACTION OF LOWER TOOTH, SINGLE, EXTERNAL APPROACH: ICD-10-PCS | Performed by: DENTIST

## 2023-12-09 PROCEDURE — 87205 SMEAR GRAM STAIN: CPT | Performed by: DENTIST

## 2023-12-09 PROCEDURE — 85025 COMPLETE CBC W/AUTO DIFF WBC: CPT

## 2023-12-09 PROCEDURE — 0J910ZX DRAINAGE OF FACE SUBCUTANEOUS TISSUE AND FASCIA, OPEN APPROACH, DIAGNOSTIC: ICD-10-PCS | Performed by: DENTIST

## 2023-12-09 RX ORDER — LIDOCAINE HYDROCHLORIDE 20 MG/ML
INJECTION, SOLUTION EPIDURAL; INFILTRATION; INTRACAUDAL; PERINEURAL AS NEEDED
Status: DISCONTINUED | OUTPATIENT
Start: 2023-12-09 | End: 2023-12-09

## 2023-12-09 RX ORDER — FENTANYL CITRATE 50 UG/ML
INJECTION, SOLUTION INTRAMUSCULAR; INTRAVENOUS AS NEEDED
Status: DISCONTINUED | OUTPATIENT
Start: 2023-12-09 | End: 2023-12-09

## 2023-12-09 RX ORDER — HYDROMORPHONE HCL/PF 1 MG/ML
0.5 SYRINGE (ML) INJECTION
Status: DISCONTINUED | OUTPATIENT
Start: 2023-12-09 | End: 2023-12-09 | Stop reason: HOSPADM

## 2023-12-09 RX ORDER — LIDOCAINE HYDROCHLORIDE AND EPINEPHRINE 10; 10 MG/ML; UG/ML
INJECTION, SOLUTION INFILTRATION; PERINEURAL AS NEEDED
Status: DISCONTINUED | OUTPATIENT
Start: 2023-12-09 | End: 2023-12-09 | Stop reason: HOSPADM

## 2023-12-09 RX ORDER — MIDAZOLAM HYDROCHLORIDE 2 MG/2ML
INJECTION, SOLUTION INTRAMUSCULAR; INTRAVENOUS AS NEEDED
Status: DISCONTINUED | OUTPATIENT
Start: 2023-12-09 | End: 2023-12-09

## 2023-12-09 RX ORDER — FENTANYL CITRATE/PF 50 MCG/ML
50 SYRINGE (ML) INJECTION
Status: DISCONTINUED | OUTPATIENT
Start: 2023-12-09 | End: 2023-12-09 | Stop reason: HOSPADM

## 2023-12-09 RX ORDER — METOCLOPRAMIDE HYDROCHLORIDE 5 MG/ML
10 INJECTION INTRAMUSCULAR; INTRAVENOUS ONCE AS NEEDED
Status: DISCONTINUED | OUTPATIENT
Start: 2023-12-09 | End: 2023-12-09 | Stop reason: HOSPADM

## 2023-12-09 RX ORDER — HYDROMORPHONE HCL/PF 1 MG/ML
SYRINGE (ML) INJECTION AS NEEDED
Status: DISCONTINUED | OUTPATIENT
Start: 2023-12-09 | End: 2023-12-09

## 2023-12-09 RX ORDER — SODIUM CHLORIDE, SODIUM LACTATE, POTASSIUM CHLORIDE, CALCIUM CHLORIDE 600; 310; 30; 20 MG/100ML; MG/100ML; MG/100ML; MG/100ML
INJECTION, SOLUTION INTRAVENOUS CONTINUOUS PRN
Status: DISCONTINUED | OUTPATIENT
Start: 2023-12-09 | End: 2023-12-09

## 2023-12-09 RX ORDER — ROCURONIUM BROMIDE 10 MG/ML
INJECTION, SOLUTION INTRAVENOUS AS NEEDED
Status: DISCONTINUED | OUTPATIENT
Start: 2023-12-09 | End: 2023-12-09

## 2023-12-09 RX ORDER — PROPOFOL 10 MG/ML
INJECTION, EMULSION INTRAVENOUS AS NEEDED
Status: DISCONTINUED | OUTPATIENT
Start: 2023-12-09 | End: 2023-12-09

## 2023-12-09 RX ADMIN — HYDROMORPHONE HYDROCHLORIDE 0.5 MG: 1 INJECTION, SOLUTION INTRAMUSCULAR; INTRAVENOUS; SUBCUTANEOUS at 10:04

## 2023-12-09 RX ADMIN — MIDAZOLAM 2 MG: 1 INJECTION INTRAMUSCULAR; INTRAVENOUS at 10:51

## 2023-12-09 RX ADMIN — ONDANSETRON 4 MG: 2 INJECTION INTRAMUSCULAR; INTRAVENOUS at 11:24

## 2023-12-09 RX ADMIN — SODIUM CHLORIDE, SODIUM LACTATE, POTASSIUM CHLORIDE, AND CALCIUM CHLORIDE: .6; .31; .03; .02 INJECTION, SOLUTION INTRAVENOUS at 10:49

## 2023-12-09 RX ADMIN — SUGAMMADEX 150 MG: 100 INJECTION, SOLUTION INTRAVENOUS at 11:23

## 2023-12-09 RX ADMIN — SODIUM CHLORIDE 3 G: 9 INJECTION, SOLUTION INTRAVENOUS at 20:02

## 2023-12-09 RX ADMIN — MUPIROCIN: 20 OINTMENT TOPICAL at 17:15

## 2023-12-09 RX ADMIN — FENTANYL CITRATE 50 MCG: 50 INJECTION INTRAMUSCULAR; INTRAVENOUS at 10:54

## 2023-12-09 RX ADMIN — LIDOCAINE HYDROCHLORIDE 100 MG: 20 INJECTION, SOLUTION EPIDURAL; INFILTRATION; INTRACAUDAL; PERINEURAL at 10:55

## 2023-12-09 RX ADMIN — KETOROLAC TROMETHAMINE 30 MG: 30 INJECTION, SOLUTION INTRAMUSCULAR at 05:10

## 2023-12-09 RX ADMIN — PROPOFOL 200 MG: 10 INJECTION, EMULSION INTRAVENOUS at 10:54

## 2023-12-09 RX ADMIN — SODIUM CHLORIDE 3 G: 9 INJECTION, SOLUTION INTRAVENOUS at 08:11

## 2023-12-09 RX ADMIN — SODIUM CHLORIDE 3 G: 9 INJECTION, SOLUTION INTRAVENOUS at 13:59

## 2023-12-09 RX ADMIN — HYDROMORPHONE HYDROCHLORIDE 0.5 MG: 1 INJECTION, SOLUTION INTRAMUSCULAR; INTRAVENOUS; SUBCUTANEOUS at 11:25

## 2023-12-09 RX ADMIN — ROCURONIUM BROMIDE 40 MG: 10 INJECTION, SOLUTION INTRAVENOUS at 10:55

## 2023-12-09 RX ADMIN — MUPIROCIN: 20 OINTMENT TOPICAL at 08:11

## 2023-12-09 RX ADMIN — SODIUM CHLORIDE 3 G: 9 INJECTION, SOLUTION INTRAVENOUS at 02:33

## 2023-12-09 RX ADMIN — HYDROMORPHONE HYDROCHLORIDE 0.5 MG: 1 INJECTION, SOLUTION INTRAMUSCULAR; INTRAVENOUS; SUBCUTANEOUS at 20:04

## 2023-12-09 RX ADMIN — MUPIROCIN: 20 OINTMENT TOPICAL at 20:02

## 2023-12-09 RX ADMIN — PROPOFOL 100 MG: 10 INJECTION, EMULSION INTRAVENOUS at 10:55

## 2023-12-09 NOTE — OP NOTE
OPERATIVE REPORT  PATIENT NAME: Shelbi Michael    :  1985  MRN: [id number]  Pt Location: [facility] OR ROOM 08    SURGERY DATE: 2023    Surgeon(s) and Role:     * Yosef Jain DMD - Primary    Preop Diagnosis:  Mandibular abscess [M27.2]    Post-Op Diagnosis Codes:     * Mandibular abscess [M27.2]    Procedure(s):  INCISION AND DRAINAGE (I&D) OF NECK (SUBMENTAL AND RIGHT SUBMANDIBULAR SPACE ABSCESSES)  2. EXTRACTION OF TOOTH # 32    Specimen(s):  ID Type Source Tests Collected by Time Destination   A : submantal space Tissue Neck ANAEROBIC CULTURE AND GRAM STAIN, CULTURE, TISSUE AND GRAM STAIN, WOUND CULTURE Charity Estrada DMD 2023 1114      Estimated Blood Loss: Minimal    Drains:  Open Drain Midline Neck (Active)   Number of days: 0     Anesthesia Type: General    Operative Indications: Mandibular abscess [M27.2]    Operative Findings: Grossly carious tooth #32, submental and submandibular space abscesses. Complications: None    Procedure and Technique: On the day of surgery, the patient was seen by myself in the pre-op area. The procedure, risks, benefits including no treatment were discussed in detail with the patient and the consents were reviewed. The patient was transported to the OR by anesthesia team. The patient was intubated with an oral tube by the anesthesia team. The patient care was given to the OMFS team for the procedure part. The patient was prepped and draped in a standard OMFS fashion for an intraoral and extraoral procedure. A surgical time out was performed and the surgical site, patient's identification and the procedures were verified. A Time Out was performed verifying patient procedure and laterality. Vitals, associated labs and imaging reviewed. Attention directed to oral cavity. A total of 10 mL of 1% lidocaine with 1:100,000 epinephrine was used to anesthetize the extraoral and intraoral I&D site incisions.      A 15 blade was used to make a 1 cm linear skin incision approx. 4 cm below the inferior border of mandible in the midline neck skin crease, blunt dissection carried through the subcutaneous layer and platysma, inferior border of mandible encountered and the tines of the Nunu forceps directed towards the medial aspect of body of mandible and swept anteroposteriorly while staying on bone at all times. The tines of the Nunu forceps were introduced in the right submandibular and submental spaces. An approximately 3 mL of seropurulent drainage was obtained. An aerobic and anaerobic culture swab was taken for microbiology evaluation. One 1/4 inch penrose drain was placed in the submental space and secured with interrupted 3-0 silk sutures. Positive hemostasis was achieved. Attention directed to mandibular right quadrant. A # 15 blade was used to place a sulcular incision in relation to tooth #32. A # 9 mucoperiosteal elevator was used to reflect a full thickness mucoperiosteal flap. An elevator was used to luxate tooth #32 and extracted with forceps. Curettage and copious normal saline irrigation of the socket done. The oral cavity was irrigated thoroughly and suctioned with a yankeur suction. Thus, the planned procedure completed, the patient's care was given back to the anesthesia team for the extubation part. The patient was extubated and transported to the PACU with stable vital signs and breathing spontaneously by the anesthesia team.     I was told by the OR nurse staff that all needle, sponge and instrument counts were found to be correct and there were no intra-operative complications noted. I was present and scrubbed in for the entire procedure.     Patient Disposition: PACU , hemodynamically stable, and extubated and stable    SIGNATURE: Geoffrey Gunter DMD  DATE: December 9, 2023  TIME: 12:19 PM

## 2023-12-09 NOTE — ANESTHESIA POSTPROCEDURE EVALUATION
Post-Op Assessment Note    CV Status:  Stable  Pain Score: 0    Pain management: adequate       Mental Status:  Sleepy   Hydration Status:  Stable   PONV Controlled:  Controlled   Airway Patency:  Patent  Two or more mitigation strategies used for obstructive sleep apnea   Post Op Vitals Reviewed: Yes      Staff: CRNA, Anesthesiologist               /52   Temp 97.2   Pulse 91   Resp 18   SpO2 100

## 2023-12-09 NOTE — ANESTHESIA PREPROCEDURE EVALUATION
Procedure:  INCISION AND DRAINAGE (I&D) HEAD/FACE (Right: Face)  EXTRACTION TEETH MULTIPLE (Bilateral: Mouth)    Relevant Problems   No relevant active problems        Physical Exam    Airway    Mallampati score: II  TM Distance: >3 FB  Neck ROM: full     Dental        Cardiovascular      Pulmonary      Other Findings    Anesthesia Plan  ASA Score- 2     Anesthesia Type- general with ASA Monitors. Additional Monitors:     Airway Plan: ETT. Plan Factors-Exercise tolerance (METS): >4 METS. Chart reviewed. Existing labs reviewed. Patient summary reviewed. Patient is a current smoker. Patient instructed to abstain from smoking on day of procedure. Patient did not smoke on day of surgery. There is medical exclusion for perioperative obstructive sleep apnea risk education. Induction- intravenous. Postoperative Plan- Plan for postoperative opioid use. Informed Consent- Anesthetic plan and risks discussed with patient. I personally reviewed this patient with the CRNA. Discussed and agreed on the Anesthesia Plan with the CRNA. Binta Marrero

## 2023-12-09 NOTE — PROGRESS NOTES
INTERNAL MEDICINE RESIDENCY PROGRESS NOTE     Name: Gladys Hearn   Age & Sex: 41 y.o. male   MRN: [id number]  Unit/Bed#: -01   Encounter: [id number]  Team: SOD Team A    PATIENT INFORMATION     Name: Gladys Hearn   Age & Sex: 41 y.o. male   MRN: [id number]  Hospital Stay Days: 3    ASSESSMENT/PLAN     Principal Problem:    Mandibular abscess      * Mandibular abscess  Assessment & Plan  42 y/o M presented to ED with R dental pain, increased swelling of R side of face and difficulty swallowing.  CT Soft tissue neck: Significant periodontal disease and caries, greatest involving the left maxilla. Small fluid collection most compatible with abscess adjacent to right mandibular periodontal disease.  CT Chest: No acute intrathoracic pathology. Patient is scheduled tentatively today for I&D of abscess and bilateral teeth extraction. Plan:   Surgical intervention with OMFS today at 8 AM  Continue Unasyn 3g IV q6H  Pain management  Zofran prn for nausea   Continue IVF        Disposition: inpatient level of care pending surgical intervention     SUBJECTIVE     Patient seen and examined. No acute events overnight. Patient mentions feeling well without any acute complaints. Pain well controlled. No fever, chills, shortness of breath, dysphagia, nausea, vomiting, abdominal pain. Adequate urine output and bowel movements. Anxious for the surgery. OBJECTIVE     Vitals:    23 1539 23 1540 23 2203 23 0733   BP: 120/78 120/78 125/85 123/83   Pulse: 83 83 72 60   Resp:    16   Temp: 98.1 °F (36.7 °C) 98.1 °F (36.7 °C) 98.1 °F (36.7 °C) 98.1 °F (36.7 °C)   SpO2: 97% 98% 98% 99%      Temperature:   Temp (24hrs), Av.1 °F (36.7 °C), Min:98.1 °F (36.7 °C), Max:98.1 °F (36.7 °C)    Temperature: 98.1 °F (36.7 °C)  Intake & Output:  I/O          07 07 0700 12/09 0701  12/10 07    P. O.  286     Total Intake  286     Urine  1     Total Output  1     Net  +285                  Weights: There is no height or weight on file to calculate BMI. Weight (last 2 days)       None          Physical Exam  Constitutional:       General: He is not in acute distress. Appearance: Normal appearance. He is not diaphoretic. HENT:      Head: Normocephalic and atraumatic. Mouth/Throat:      Mouth: Mucous membranes are moist.   Eyes:      Pupils: Pupils are equal, round, and reactive to light. Cardiovascular:      Rate and Rhythm: Normal rate and regular rhythm. Pulses: Normal pulses. Heart sounds: Normal heart sounds. Pulmonary:      Effort: Pulmonary effort is normal.      Breath sounds: Normal breath sounds. Abdominal:      General: Bowel sounds are normal.      Palpations: Abdomen is soft. Musculoskeletal:         General: Tenderness (left cervical area) present. Right lower leg: No edema. Left lower leg: No edema. Lymphadenopathy:      Cervical: Cervical adenopathy present. Left cervical: Superficial cervical adenopathy present. Skin:     General: Skin is warm. Capillary Refill: Capillary refill takes less than 2 seconds. Coloration: Skin is not jaundiced or pale. Findings: No rash. Neurological:      General: No focal deficit present. Mental Status: He is alert and oriented to person, place, and time. Psychiatric:         Mood and Affect: Mood normal.         Behavior: Behavior normal.       LABORATORY DATA     Labs: I have personally reviewed pertinent reports.   Results from last 7 days   Lab Units 12/09/23  0518 12/08/23  0510 12/07/23  0442 12/06/23  0322   WBC Thousand/uL 8.03 10.67* 12.43* 11.80*   HEMOGLOBIN g/dL 11.7* 12.5 13.1 14.0   HEMATOCRIT % 34.6* 37.0 38.1 41.0   PLATELETS Thousands/uL 230 216 200 196   NEUTROS PCT % 67  --   --  87*   MONOS PCT % 11  --   --  7   MONO PCT %  --   --  4  --    EOS PCT % 1  --  0 0      Results from last 7 days   Lab Units 12/09/23  0518 12/08/23  0510 12/07/23  0442   POTASSIUM mmol/L 3.8 3.9 3.9   CHLORIDE mmol/L 106 105 103   CO2 mmol/L 23 25 25   BUN mg/dL 10 10 10   CREATININE mg/dL 0.78 0.73 0.79   CALCIUM mg/dL 8.6 8.4 8.4   ALK PHOS U/L 67 65  --    ALT U/L 11 10  --    AST U/L 17 15  --                             IMAGING & DIAGNOSTIC TESTING     Radiology Results: I have personally reviewed pertinent reports. CT chest wo contrast    Result Date: 12/8/2023  Impression: No acute intrathoracic pathology. Poorly evaluated hypodense lesion in the right hepatic lobe measuring up to approximately 3.6 cm. This should be further evaluated with contrast-enhanced CT abdomen pelvis or right upper quadrant ultrasound, which can be done on a nonemergent basis as clinically indicated. Workstation performed: [id number]     CT soft tissue neck    Result Date: 12/6/2023  Impression: Significant periodontal disease and caries, greatest involving the left maxilla. Small fluid collection most compatible with abscess adjacent to right mandibular periodontal disease. Other nonemergent findings above. Workstation performed: [id number]     Other Diagnostic Testing: I have personally reviewed pertinent reports. ACTIVE MEDICATIONS     Current Facility-Administered Medications   Medication Dose Route Frequency    acetaminophen (TYLENOL) tablet 650 mg  650 mg Oral Q6H PRN    ampicillin-sulbactam (UNASYN) 3 g in sodium chloride 0.9 % 100 mL IVPB  3 g Intravenous Q6H    HYDROmorphone (DILAUDID) injection 0.5 mg  0.5 mg Intravenous Q4H PRN    ketorolac (TORADOL) injection 30 mg  30 mg Intravenous Q6H PRN    mupirocin (BACTROBAN) 2 % ointment   Topical TID    ondansetron (ZOFRAN) injection 4 mg  4 mg Intravenous Q6H PRN       VTE Pharmacologic Prophylaxis: Reason for no pharmacologic prophylaxis low risk  VTE Mechanical Prophylaxis: sequential compression device    Portions of the record may have been created with voice recognition software.   Occasional wrong word or "sound a like" substitutions may have occurred due to the inherent limitations of voice recognition software.   Read the chart carefully and recognize, using context, where substitutions have occurred.  ==  Sinai Aranda MD  [facility]  Internal Medicine Residency PGY-2

## 2023-12-10 VITALS
SYSTOLIC BLOOD PRESSURE: 126 MMHG | OXYGEN SATURATION: 96 % | RESPIRATION RATE: 17 BRPM | HEART RATE: 70 BPM | DIASTOLIC BLOOD PRESSURE: 76 MMHG | TEMPERATURE: 98.3 F

## 2023-12-10 PROBLEM — M27.2 MANDIBULAR ABSCESS: Status: RESOLVED | Noted: 2023-12-06 | Resolved: 2023-12-10

## 2023-12-10 LAB
ANION GAP SERPL CALCULATED.3IONS-SCNC: 8 MMOL/L
BUN SERPL-MCNC: 10 MG/DL (ref 5–25)
CALCIUM SERPL-MCNC: 8.9 MG/DL (ref 8.4–10.2)
CHLORIDE SERPL-SCNC: 105 MMOL/L (ref 96–108)
CO2 SERPL-SCNC: 25 MMOL/L (ref 21–32)
CREAT SERPL-MCNC: 0.88 MG/DL (ref 0.6–1.3)
ERYTHROCYTE [DISTWIDTH] IN BLOOD BY AUTOMATED COUNT: 13.9 % (ref 11.6–15.1)
GFR SERPL CREATININE-BSD FRML MDRD: 110 ML/MIN/1.73SQ M
GLUCOSE SERPL-MCNC: 99 MG/DL (ref 65–140)
HCT VFR BLD AUTO: 33.2 % (ref 36.5–49.3)
HGB BLD-MCNC: 11.7 G/DL (ref 12–17)
MCH RBC QN AUTO: 30.2 PG (ref 26.8–34.3)
MCHC RBC AUTO-ENTMCNC: 35.2 G/DL (ref 31.4–37.4)
MCV RBC AUTO: 86 FL (ref 82–98)
PLATELET # BLD AUTO: 282 THOUSANDS/UL (ref 149–390)
PMV BLD AUTO: 10.4 FL (ref 8.9–12.7)
POTASSIUM SERPL-SCNC: 3.6 MMOL/L (ref 3.5–5.3)
RBC # BLD AUTO: 3.88 MILLION/UL (ref 3.88–5.62)
SODIUM SERPL-SCNC: 138 MMOL/L (ref 135–147)
WBC # BLD AUTO: 10.87 THOUSAND/UL (ref 4.31–10.16)

## 2023-12-10 PROCEDURE — 85027 COMPLETE CBC AUTOMATED: CPT

## 2023-12-10 PROCEDURE — 99238 HOSP IP/OBS DSCHRG MGMT 30/<: CPT | Performed by: HOSPITALIST

## 2023-12-10 PROCEDURE — 80048 BASIC METABOLIC PNL TOTAL CA: CPT

## 2023-12-10 RX ORDER — AMOXICILLIN AND CLAVULANATE POTASSIUM 875; 125 MG/1; MG/1
1 TABLET, FILM COATED ORAL EVERY 12 HOURS SCHEDULED
Qty: 14 TABLET | Refills: 0 | Status: SHIPPED | OUTPATIENT
Start: 2023-12-10 | End: 2023-12-17

## 2023-12-10 RX ADMIN — SODIUM CHLORIDE 3 G: 9 INJECTION, SOLUTION INTRAVENOUS at 02:09

## 2023-12-10 RX ADMIN — MUPIROCIN: 20 OINTMENT TOPICAL at 08:04

## 2023-12-10 RX ADMIN — SODIUM CHLORIDE 3 G: 9 INJECTION, SOLUTION INTRAVENOUS at 08:04

## 2023-12-10 RX ADMIN — ONDANSETRON 4 MG: 2 INJECTION INTRAMUSCULAR; INTRAVENOUS at 14:30

## 2023-12-10 NOTE — DISCHARGE SUMMARY
INTERNAL MEDICINE RESIDENCY DISCHARGE SUMMARY     Talib Bonner   41 y.o. male  MRN: [id number]  Room/Bed: /-     [address]   Encounter: [id number]    Active Problems: There are no active Hospital Problems. * Mandibular abscess-resolved as of 12/10/2023  Assessment & Plan  40 y/o M presented to ED with R dental pain, increased swelling of R side of face and difficulty swallowing. 12/6 CT Soft tissue neck: Significant periodontal disease and caries, greatest involving the left maxilla. Small fluid collection most compatible with abscess adjacent to right mandibular periodontal disease. 12/7 CT Chest: No acute intrathoracic pathology. Patient is POD 1 for I&D of abscess with OMFS. Patient feels fine, had a bowel movement, denies cough, chest pain, nausea, vomiting, fever, chills. Plan:   Discharge with Augmentin 875-125 mg twice daily. Patient was counseled to discard previously prescribed penicillin  Follow-up with OMFS outpatient        [address] [place] [address]     61-year-old male with no significant past medical history presented to [address] [place] [address] on 12/6 with right lower dental pain. At that time the emergency PA's note read, "61-year-old male with history of asthma presents to ED for evaluation of dental pain, facial swelling, chills, difficulty swallowing. Patient was seen here yesterday for dental pain without chills, facial swelling, difficulty swallowing. The latter are new since discharge. He was given prescription for penicillin, Percocet, naproxen. Last dose of these medications was 7 PM.  Patient is concerned that despite taking medications the swelling in the right side of his face and overall pain is increased. He is now describing difficulty with swallowing. Patient is concerned that he is experiencing an allergic reaction to medication versus worsening of infection.   Denies previous allergic reaction to penicillins, opioids. Patient rates the pain as a 10 out of 10. Denies immunocompromising conditions. Current dental pain has been present for the past 3 days. He is intending on following up with dentist for definitive treatment. Patient endorsing chills as well. Denies shortness of breath, chest pain, abdominal pain, pain with urination, blood in urine, blood in stool, constipation, diarrhea, increased urinary frequency, ear discharge."    Patient was eventually transferred to [address] [place] [address] later that same day for evaluation with [facility]. On arrival to [facility] said patient would benefit from extraction of multiple teeth and incision and drainage of right mandibular infection, pending OR schedule accommodation. Preoperatively, patient began to develop associated pain towards left sternocleidomastoid muscle, CT of chest was obtained which was negative for any intrathoracic lesions. Patient was put as an add-on procedural for several days, but did not have the procedure until 12/9. On POD 1, patient endorsed resolution of symptoms, was afebrile, denied chest pain, nausea, vomiting, headache, dental pain and was able to have a bowel movement. 12/10 and decision was made to discharge patient with Augmentin regimen, and plans to follow-up with OMFS outpatient. Patient was also counseled to discard previously prescribed penicillin.     Plan for TCM follow-up:    Establishment of care, routine lab workup: Full physical exam, depression screening, Hemoglobin A1c, lipid panel in order to calculate ASCVD risk score    Ensure patient finished Augmentin regimen, and discarded previously prescribed penicillin    Gather detailed family history, including history of colon cancer in first-degree relative, in order to see when patient's first colonoscopy will be    DISCHARGE INFORMATION     PCP at Discharge: Please schedule appointment with [facility] to establish care    Admitting Provider: Maura Rivero MD  Admission Date: 12/6/2023    Discharge Provider: Maura Rivero MD  Discharge Date: 12/10/2023    Discharge Disposition: [address]  Discharge Condition: good  Discharge with Lines: no    Discharge Diet: regular diet  Activity Restrictions: none  Test Results Pending at Discharge:     Discharge Diagnoses:  Principal Problem (Resolved):    Mandibular abscess  Active Problems:    * No active hospital problems. *      Consulting Providers:      Diagnostic & Therapeutic Procedures Performed:  CT chest wo contrast    Result Date: 12/8/2023  Impression: No acute intrathoracic pathology. Poorly evaluated hypodense lesion in the right hepatic lobe measuring up to approximately 3.6 cm. This should be further evaluated with contrast-enhanced CT abdomen pelvis or right upper quadrant ultrasound, which can be done on a nonemergent basis as clinically indicated. Workstation performed: [id number]     CT soft tissue neck    Result Date: 12/6/2023  Impression: Significant periodontal disease and caries, greatest involving the left maxilla. Small fluid collection most compatible with abscess adjacent to right mandibular periodontal disease. Other nonemergent findings above.  Workstation performed: [id number]       Code Status: Level 1 - Full Code  Advance Directive & Living Will: <no information>  Power of :    POLST:      Medications:  Current Discharge Medication List        Current Discharge Medication List        Current Discharge Medication List        CONTINUE these medications which have NOT CHANGED    Details   penicillin V potassium (VEETID) 500 mg tablet Take 1 tablet (500 mg total) by mouth 4 (four) times a day for 7 days  Qty: 28 tablet, Refills: 0    Associated Diagnoses: Dental caries             Allergies:  No Known Allergies    FOLLOW-UP     PCP Outpatient Follow-up:  Establish Care with [address] [place] [address],Third Floor Providers Follow-up:  Follow up with OMFS      Active Issues Requiring Follow-up:   none    Discharge Statement:   I spent 30 minutes minutes discharging the patient. This time was spent on the day of discharge. I had direct contact with the patient on the day of discharge. Additional documentation is required if more than 30 minutes were spent on discharge. Portions of the record may have been created with voice recognition software. Occasional wrong word or "sound a like" substitutions may have occurred due to the inherent limitations of voice recognition software.   Read the chart carefully and recognize, using context, where substitutions have occurred.    ==  Liudmila Browne  Internal Medicine Resident PGY-1

## 2023-12-10 NOTE — DISCHARGE INSTR - AVS FIRST PAGE
Please follow-up with OMFS outpatient  Please complete entire regimen of Augmentin, even if you begin to feel better

## 2023-12-10 NOTE — QUICK NOTE
PROGRESS NOTE    Pt seen 12/10/23 1:03 PM    Assessment: 41 y.o. male presented on 12/6/23 w/ LR dental pain, swelling a/w carious #32 now POD1 extraction of #32 and I&D via EO/IO approaches. Penrose drain removed this AM and patient clear for discharge from OMFS perspective. Plan:  - Continue abx: augmentin bid x7 days on d/c  - Analgesia as per primary  - Soft food diet x1 week  - Follow up with a general dentist  - Patient may follow up PRN at outpatient [facility] in 1-2 weeks - Patient can call to schedule an appointment after discharge: [address] [place] [address], Crow Willett ([phone])    Interval history:  - Pt reports minimal LR pain  - Minimal SS drainage from submental incision (drain was not in place), drain removed  - Pt is ambulating, voiding, and tolerating PO.  - Pt reports no fever, nausea, vomiting, chills, shortness of breath. Pt tolerating secretions. I/O last 3 completed shifts: In: 600 [I.V.:600]  Out: 1 [Urine:1]    No intake/output data recorded. Current Facility-Administered Medications:     acetaminophen (TYLENOL) tablet 650 mg, 650 mg, Oral, Q6H PRN, Thea Bellamy MD    ampicillin-sulbactam (UNASYN) 3 g in sodium chloride 0.9 % 100 mL IVPB, 3 g, Intravenous, Q6H, Christopher Azul MD, Last Rate: 200 mL/hr at 12/10/23 0804, 3 g at 12/10/23 0804    HYDROmorphone (DILAUDID) injection 0.5 mg, 0.5 mg, Intravenous, Q4H PRN, Thea Bellamy MD, 0.5 mg at 12/09/23 2004    [facility] Hold] ketorolac (TORADOL) injection 30 mg, 30 mg, Intravenous, Q6H PRN, Thea Bellamy MD, 30 mg at 12/09/23 0510    mupirocin (BACTROBAN) 2 % ointment, , Topical, TID, Thea Bellamy MD, Given at 12/10/23 0804    ondansetron (ZOFRAN) injection 4 mg, 4 mg, Intravenous, Q6H PRN, Thea Bellamy MD, 4 mg at 12/09/23 1124     Physical Exam:  GE: AAOx3. NAD. HEENT:    Head: Mild submental facial swelling w/ TTP. Inferior border of the mandible not palpable in LR posterior.  Drain not in place, minimal SS from submental incision  Mouth: YOSELIN: 40mm. Caries of #17,19. #32 exo site hemostatic w/o drainage. #59,92,31 mobile. #15 not mobile. FofM soft NTNE, firm and TTP. Uvula midline. Resp: no respiratory distress on RA    Results:  Microbiology Results (last 21 days)       Collected Updated Procedure Result Status Patient Facility Result Comment      12/09/2023 1114 12/10/2023 0729 Anaerobic culture and Gram stain [[id number]]   Tissue from Neck    Preliminary result [facility]  Component Value   Anaerobic Culture Culture results to follow. [P]                12/09/2023 1114 12/10/2023 1216 Wound culture and Gram stain [[id number]]   Tissue from Neck    Preliminary result [facility]  Component Value   Wound Culture No growth  [P]    Gram Stain Result 1+ Polys  [P]     No bacteria seen  [P]                            Please call or page OMFS resident on call after hours.

## 2023-12-11 ENCOUNTER — TELEPHONE (OUTPATIENT)
Dept: INTERNAL MEDICINE CLINIC | Facility: CLINIC | Age: 38
End: 2023-12-11

## 2023-12-11 NOTE — TELEPHONE ENCOUNTER
----- Message from Adair Kendall MD sent at 12/10/2023  9:43 AM EST -----  Jeny Mirza,    Please schedule [facility] in order to establish care.     Thanks so much, appreciate you,  Sigrid Sosa

## 2023-12-11 NOTE — UTILIZATION REVIEW
NOTIFICATION OF ADMISSION DISCHARGE   This is a Notification of Discharge from [facility]. Please be advised that this patient has been discharge from our facility. Below you will find the admission and discharge date and time including the patient’s disposition. UTILIZATION REVIEW CONTACT:  Sven Pemberton  Utilization   Network Utilization Review Department  Phone: [phone] x carefully listen to the prompts. All voicemails are confidential.  Email: [email or web address]     ADMISSION INFORMATION  PRESENTATION DATE: 12/6/2023  3:48 PM  OBERVATION ADMISSION DATE:   INPATIENT ADMISSION DATE: 12/6/23  3:48 PM   DISCHARGE DATE: 12/10/2023  3:01 PM   DISPOSITION:Home/Self Care    Network Utilization Review Department  ATTENTION: Please call with any questions or concerns to [phone] and carefully listen to the prompts so that you are directed to the right person. All voicemails are confidential.   For Discharge needs, contact Care Management DC Support Team at [phone] opt. [phone]  Send all requests for admission clinical reviews, approved or denied determinations and any other requests to dedicated fax number below belonging to the campus where the patient is receiving treatment.  List of dedicated fax numbers for the Facilities:  [place] DENIALS (Administrative/Medical Necessity) [phone]   DISCHARGE SUPPORT TEAM (Network) [phone] [facility] (M[facility]ediatrics) [phone]   [facility] [address] [place] [address] [phone]   [facility] [phone]   [address] [place] [address]  [address]  [phone]

## 2023-12-11 NOTE — TELEPHONE ENCOUNTER
Please verify insurance and offer new patient appt if appropriate.  Patient lives in [facility] [place] so may prefer [facility]

## 2023-12-12 LAB — BACTERIA SPEC ANAEROBE CULT: NORMAL

## 2023-12-14 LAB
BACTERIA WND AEROBE CULT: ABNORMAL
GRAM STN SPEC: ABNORMAL
GRAM STN SPEC: ABNORMAL

## (undated) DEVICE — STERILE MANDIBLE PACK: Brand: CARDINAL HEALTH

## (undated) DEVICE — CULTURE TUBE AEROBIC

## (undated) DEVICE — GLOVE INDICATOR PI UNDERGLOVE SZ 8 BLUE

## (undated) DEVICE — CULTURE TUBE ANAEROBIC

## (undated) DEVICE — INTENDED FOR TISSUE SEPARATION, AND OTHER PROCEDURES THAT REQUIRE A SHARP SURGICAL BLADE TO PUNCTURE OR CUT.: Brand: BARD-PARKER ® CARBON RIB-BACK BLADES

## (undated) DEVICE — SPONGE STICK WITH PVP-I: Brand: KENDALL

## (undated) DEVICE — GLOVE SRG BIOGEL ORTHOPEDIC 8

## (undated) DEVICE — STRL PENROSE DRAIN 18" X 1/4": Brand: CARDINAL HEALTH

## (undated) DEVICE — PENCIL ELECTROSURG E-Z CLEAN -0035H

## (undated) DEVICE — DRAIN SPONGES,6 PLY: Brand: EXCILON

## (undated) DEVICE — ELECTRODE NEEDLE MOD E-Z CLEAN 2.75IN 7CM -0013M

## (undated) DEVICE — SUT SILK 3-0 PS-1 18 IN 1684G

## (undated) DEVICE — SUT CHROMIC 3-0 SH 27 IN G122H